# Patient Record
Sex: FEMALE | Race: WHITE | NOT HISPANIC OR LATINO | Employment: PART TIME | URBAN - METROPOLITAN AREA
[De-identification: names, ages, dates, MRNs, and addresses within clinical notes are randomized per-mention and may not be internally consistent; named-entity substitution may affect disease eponyms.]

---

## 2017-03-16 ENCOUNTER — ALLSCRIPTS OFFICE VISIT (OUTPATIENT)
Dept: OTHER | Facility: OTHER | Age: 47
End: 2017-03-16

## 2017-03-21 ENCOUNTER — LAB CONVERSION - ENCOUNTER (OUTPATIENT)
Dept: OTHER | Facility: OTHER | Age: 47
End: 2017-03-21

## 2017-03-21 LAB
ADDITIONAL INFORMATION (HISTORICAL): NORMAL
ADEQUACY: (HISTORICAL): NORMAL
COMMENT (HISTORICAL): NORMAL
CYTOTECHNOLOGIST: (HISTORICAL): NORMAL
HPV MRNA E6/E7 (HISTORICAL): NOT DETECTED
INTERPRETATION (HISTORICAL): NORMAL
LMP (HISTORICAL): NORMAL
PREV. BX: (HISTORICAL): NORMAL
PREV. PAP (HISTORICAL): NORMAL
SOURCE (HISTORICAL): NORMAL

## 2017-03-24 ENCOUNTER — GENERIC CONVERSION - ENCOUNTER (OUTPATIENT)
Dept: OTHER | Facility: OTHER | Age: 47
End: 2017-03-24

## 2017-11-21 ENCOUNTER — ALLSCRIPTS OFFICE VISIT (OUTPATIENT)
Dept: OTHER | Facility: OTHER | Age: 47
End: 2017-11-21

## 2017-11-21 LAB
HGB UR QL STRIP.AUTO: NORMAL
LEUKOCYTE ESTERASE UR QL STRIP: 1
PH UR STRIP.AUTO: 7 [PH]
SP GR UR STRIP.AUTO: 1.01

## 2017-11-22 NOTE — PROGRESS NOTES
Assessment    1  Symptoms involving urinary system (188 99) (R39 9)    Plan  Symptoms involving urinary system    · Nitrofurantoin Monohyd Macro 100 MG Oral Capsule; TAKE 1 CAPSULE EVERY 97VCEHO DAILY   Rx By: Óscar Boss; Dispense: 7 Days ; #:14 Capsule; Refill: 0;Symptoms involving urinary system; KEVIN = N; Verified Transmission to [facility] #[id number] Last Updated By: System, [employer]; 11/21/2017 11:00:35 AM   · Follow-up PRN Evaluation and Treatment  Follow-up  Status: Complete  Done:21Nov2017   Ordered; Symptoms involving urinary system; Ordered By: Óscar Boss Performed:  Due: [id number]   · Urine Dip Non-Automated- POC; Status:Complete - Retrospective Authorization;   Done:21Nov2017 11:46AM   Performed: In Office; [phone]; Last Updated By:Bev Levin; 11/21/2017 11:47:10 AM;Ordered;involving urinary system; Ordered By:Sarath Chan;    Discussion/Summary  Discussion Summary:   We discussed her urinary sx  and today's findings which are consistent with a UTI  She will use the Macrobid as directed plus FF  She will continue use of IBU for her left sided discomfort  If this discomfort continues she WCB or see her PCP  Goals and Barriers: The patient has the current Goals: To have urinary sx  resolved  The patent has the current Barriers: None  Patient's Capacity to Self-Care: Patient is able to Self-Care  Chief Complaint  Chief Complaint Free Text Note Form: Patient presents today c/o a possible bladder infection  History of Present Illness  HPI: The pt  relates that she has been experiencing urinary frequency and some bladder heaviness/pressure recently  She denies any burning with urination or urgency  She also c/o left sided discomfort over the past 3 -4 wks    This discomfort does increase with stretching and is relieved with the use of IBU  She feels that it may me muscular due to carrying heavy trays as a         Review of Systems   Female ROS: bladder pain-- and-- urinary frequency, but-- no dysuria,-- no incomplete emptying of bladder,-- initiating urination does not require straining-- and-- urine stream was not smaller  Focused-Female:  Constitutional: No fever, no chills, feels well, no tiredness, no recent weight gain or loss  Genitourinary: as noted in HPI  Active Problems    1  Encounter for routine gynecological examination with Papanicolaou smear of cervix (V72 31,V76 2) (Z01 419)   2  Encounter for screening mammogram for malignant neoplasm of breast (V76 12) (Z12 31)   3  Female pelvic pain (625 9) (R10 2)   4  Heartburn (787 1) (R12)   5  Irregular bleeding (626 4) (N92 6)    Past Medical History  1  History of gastroesophageal reflux (GERD) (V12 79) (Z87 19)   2  Normal delivery (650) (O80,Z37 9)   3  Normal delivery (650) (O80,Z37 9)   4  History of Reported Pap Smear   5  History of Summary Of Previous Pregnancies  3  (Total No )   6  History of Summary Of Previous Pregnancies Para 3  (Deliveries)  Active Problems And Past Medical History Reviewed: The active problems and past medical history were reviewed and updated today  Surgical History  1  History of Gallbladder Surgery    Family History  Family History    1  Family history of Hyperlipidemia   2  Family history of Hypertension (V17 49)   3  Family history of Lung Cancer (V16 1)    Social History     · Being A Social Drinker   · Current Every Day Smoker (305 1)  Social History Reviewed: The social history was reviewed and is unchanged  Current Meds   1  Omeprazole 40 MG Oral Capsule Delayed Release; Therapy: [id number] to (Evaluate:2017) Recorded   2  ZyrTEC Allergy 10 MG Oral Tablet Recorded  Medication List Reviewed: The medication list was reviewed and updated today  Allergies  1   Sulfites    Vitals  Vital Signs    Recorded: 47NTH5735 98:02ZH   Systolic 200    Diastolic 88    Height 5 ft 6 3 in    Patient Refused Weight Yes Yes   LMP [id number]        Physical Exam   Constitutional  General appearance: No acute distress, well appearing and well nourished  Genitourinary  External genitalia: Normal and no lesions appreciated  Vagina: Normal, no lesions or dryness appreciated  Urethra: Normal    Urethral meatus: Normal    Bladder: Normal, soft, non-tender and no prolapse or masses appreciated  Palpation of the bladder revealed no tenderness  Cervix: Normal, no palpable masses  Uterus: Normal, non-tender, not enlarged, and no palpable masses  Adnexa/parametria: Normal, non-tender and no fullness or masses appreciated  -- Minimal tenderness with palpation over left adnexal area    Psychiatric  Orientation to person, place, and time: Normal    Mood and affect: Normal        Results/Data  Urine Dip Non-Automated- POC [id number] 10:86DR Dennise Walter     Test Name Result Flag Reference   Leukocytes +1     Blood trace non hemolyzed     Ph 7     Specific Gravity 1 015           Signatures   Electronically signed by : Sejal Hopkins; Nov 21 2017 12:15PM EST                       (Author)    Electronically signed by : Adilene Hernandez DO; Nov 21 2017  1:10PM EST

## 2018-01-13 VITALS
HEIGHT: 66 IN | BODY MASS INDEX: 40.02 KG/M2 | SYSTOLIC BLOOD PRESSURE: 118 MMHG | WEIGHT: 249 LBS | DIASTOLIC BLOOD PRESSURE: 88 MMHG

## 2018-01-13 VITALS — HEIGHT: 66 IN | DIASTOLIC BLOOD PRESSURE: 88 MMHG | SYSTOLIC BLOOD PRESSURE: 140 MMHG

## 2018-01-15 NOTE — RESULT NOTES
Verified Results  THINPREP TIS AND HPV mRNA E6/E7 [id number] 25:42TK Duran Prior     Test Name Result Flag Reference   CLINICAL INFORMATION:      None given   LMP:      [id number]   PREV  PAP:      NONE GIVEN   PREV  BX:      NONE GIVEN   SOURCE:      Cervix, Endocervix   STATEMENT OF ADEQUACY:      Satisfactory for evaluation  Endocervical/transformation zone component  present  INTERPRETATION/RESULT:      Negative for intraepithelial lesion or malignancy  COMMENT:      This Pap test has been evaluated with computer  assisted technology  CYTOTECHNOLOGIST:      IMELDA HARDY(ASCP)  CT screening location: [address] [place] [address]   HPV mRNA E6/E7 Not Detected  Not Detected   This test was performed using the APTIMA HPV Assay ([employer] )  This assay detects E6/E7 viral messenger RNA (mRNA) from 14  high-risk HPV types (16,18,31,33,35,39,45,51,52,56,58,59,66,68)

## 2018-05-08 ENCOUNTER — ANNUAL EXAM (OUTPATIENT)
Dept: GYNECOLOGY | Facility: CLINIC | Age: 48
End: 2018-05-08
Payer: COMMERCIAL

## 2018-05-08 VITALS
DIASTOLIC BLOOD PRESSURE: 78 MMHG | HEIGHT: 66 IN | BODY MASS INDEX: 38.18 KG/M2 | SYSTOLIC BLOOD PRESSURE: 120 MMHG | WEIGHT: 237.6 LBS

## 2018-05-08 DIAGNOSIS — Z12.31 ENCOUNTER FOR SCREENING MAMMOGRAM FOR MALIGNANT NEOPLASM OF BREAST: ICD-10-CM

## 2018-05-08 DIAGNOSIS — Z01.419 ENCOUNTER FOR GYNECOLOGICAL EXAMINATION WITHOUT ABNORMAL FINDING: Primary | ICD-10-CM

## 2018-05-08 PROCEDURE — 99396 PREV VISIT EST AGE 40-64: CPT | Performed by: NURSE PRACTITIONER

## 2018-05-08 RX ORDER — OMEPRAZOLE 40 MG/1
CAPSULE, DELAYED RELEASE ORAL
COMMUNITY
Start: 2018-04-28

## 2018-05-08 RX ORDER — ESCITALOPRAM OXALATE 10 MG/1
TABLET ORAL DAILY
COMMUNITY
End: 2022-01-10

## 2018-05-08 RX ORDER — NAPROXEN 500 MG/1
TABLET ORAL
COMMUNITY

## 2018-05-08 RX ORDER — CETIRIZINE HYDROCHLORIDE 10 MG/1
TABLET ORAL
COMMUNITY
Start: 2018-04-24

## 2018-05-08 RX ORDER — FLUTICASONE PROPIONATE 50 MCG
SPRAY, SUSPENSION (ML) NASAL DAILY
COMMUNITY

## 2018-05-08 RX ORDER — CLOTRIMAZOLE AND BETAMETHASONE DIPROPIONATE 10; .64 MG/G; MG/G
CREAM TOPICAL
COMMUNITY
Start: 2018-05-02 | End: 2022-01-10

## 2018-05-08 RX ORDER — IBUPROFEN 800 MG/1
TABLET ORAL
COMMUNITY
Start: 2018-05-02

## 2018-05-08 NOTE — PROGRESS NOTES
Subjective      Freedom Collier is a 56 y o  female who presents for her annual exam  Periods are irregular occurring at 3 - 7 week intervals & lasting 6 days  Dysmenorrhea:mild, occurring first 1-2 days of flow  Cyclic symptoms include irritability and moodiness  No intermenstrual bleeding, spotting, or discharge  Current contraception: condoms  History of abnormal Pap smear: no  Family history of breast cancer: no  Past Medical History:   Diagnosis Date    GERD (gastroesophageal reflux disease)      Family History   Problem Relation Age of Onset    Hyperlipidemia Other     Hypertension Other     Cancer Other      Lung     Past Surgical History:   Procedure Laterality Date    GALLBLADDER SURGERY       Social History     Social History    Marital status:      Spouse name: N/A    Number of children: N/A    Years of education: N/A     Occupational History    Not on file       Social History Main Topics    Smoking status: Current Every Day Smoker    Smokeless tobacco: Not on file    Alcohol use Yes    Drug use: No    Sexual activity: Yes     Partners: Male     Other Topics Concern    Not on file     Social History Narrative    No narrative on file       Current Outpatient Prescriptions:     cetirizine (ZyrTEC) 10 mg tablet, , Disp: , Rfl:     clotrimazole-betamethasone (LOTRISONE) 1-0 05 % cream, , Disp: , Rfl:     escitalopram (LEXAPRO) 10 mg tablet, Daily, Disp: , Rfl:     fluticasone (FLONASE) 50 mcg/act nasal spray, Daily, Disp: , Rfl:     ibuprofen (MOTRIN) 800 mg tablet, , Disp: , Rfl:     naproxen (NAPROSYN) 500 mg tablet, naproxen 500 mg tablet, Disp: , Rfl:     omeprazole (PriLOSEC) 40 MG capsule, , Disp: , Rfl:       Review of Systems  Constitutional :no fever, feels well, no tiredness, no recent weight gain or loss  Cardiovascular: no complaints of slow or fast heart beat, no chest pain, no palpitations  Respiratory: no complaints of shortness of shortness of breath, no LEMUS  Breasts:no complaints of breast pain, breast lump, or nipple discharge  Gastrointestinal: no complaints of abdominal pain, constipation,nause, vomiting, or diarrhea or bloody stools  Genitourinary : no complaints of dysuria, incontinence, pelvic pain, dysmenorrhea,vaginal discharge or abnormal vaginal bleeding  Reports irregular menses  Integumentary: no complaints of skin rash or lesion,itching or dry skin  Neurological: no complaints of headache,numbness, tingling, dizziness or fainting       Objective      /78   Ht 5' 5 5" (1 664 m)   Wt 108 kg (237 lb 9 6 oz)   LMP 04/15/2018   BMI 38 94 kg/m²     General:   appears stated age","cooperative","alert" normal mood and affect   Neck: Neck: normal, supple,trachea midline, no masses   Heart: regular rate and rhythm, S1, S2 normal, no murmur, click, rub or gallop   Lungs: clear to auscultation bilaterally   Breasts: Breast exam :normal, no dimpling or skin changes noted   Abdomen: soft, non-tender, without masses or organomegaly   Vulva: Normal , no lesions   Vagina: normal , no lesions or dryness   Urethra: normal   Cervix: Normal, no palpable masses A pap smear was not done   Uterus: Normal , non-tender,not enlarged,no palpable masses   Adnexa: Normal, non-tender without fullness or masses                         Assessment     Normal GYN exam   Irregular menses     Plan      All questions answered  Breast self exam technique reviewed and patient encouraged to perform self-exam monthly  Dietary diary  Follow up as needed  Mammogram   We again discussed perimenopause vs  menopuase and what to expect    She will continue to track her menses and her sx  and WCB with any unusual changes  She will also try the use of Vit B complex to help with her moodiness

## 2019-11-26 ENCOUNTER — ANNUAL EXAM (OUTPATIENT)
Dept: GYNECOLOGY | Facility: CLINIC | Age: 49
End: 2019-11-26
Payer: COMMERCIAL

## 2019-11-26 VITALS
SYSTOLIC BLOOD PRESSURE: 128 MMHG | HEART RATE: 77 BPM | DIASTOLIC BLOOD PRESSURE: 74 MMHG | BODY MASS INDEX: 41.11 KG/M2 | WEIGHT: 255.8 LBS | HEIGHT: 66 IN

## 2019-11-26 DIAGNOSIS — N39.3 SUI (STRESS URINARY INCONTINENCE, FEMALE): ICD-10-CM

## 2019-11-26 DIAGNOSIS — E66.01 CLASS 3 SEVERE OBESITY DUE TO EXCESS CALORIES WITHOUT SERIOUS COMORBIDITY IN ADULT, UNSPECIFIED BMI (HCC): ICD-10-CM

## 2019-11-26 DIAGNOSIS — Z01.411 ENCOUNTER FOR GYNECOLOGICAL EXAMINATION (GENERAL) (ROUTINE) WITH ABNORMAL FINDINGS: Primary | ICD-10-CM

## 2019-11-26 PROCEDURE — 99396 PREV VISIT EST AGE 40-64: CPT | Performed by: OBSTETRICS & GYNECOLOGY

## 2019-11-26 NOTE — PROGRESS NOTES
Assessment/Plan:    Weight gain - patient agreeable to referral to weight management center/ patient will check with PCP on thyroid blood work and call the office if it was not done  OLIVIER - conservative and surgical options reviewed  Patient will consider and notify the office if she would like to proceed surgically  She will continue kegels for now  Advised monthly SBE, annual CBE and continued annual mammography  Reviewed ASCCP guidelines and recommended pap with cotesting q3 yrs for this low risk patient  No pap done today  RTO in one year or sooner PRN  Diagnoses and all orders for this visit:    Encounter for gynecological examination (general) (routine) with abnormal findings    OLIVIER (stress urinary incontinence, female)    Class 3 severe obesity due to excess calories without serious comorbidity in adult, unspecified BMI ([facility] [address] )  -     Ambulatory referral to Weight Management; Future        Subjective:      Patient ID: Erna Jimenez is a 51 y o  female  This patient presents for routine annual gyn exam    She reports light irregular menses lasting about 5 days  LMP 9/11/19  She has concerns regarding weight gain, fatigue  Has an overall feeling of heat intolerance "all the time," without specific hot flashes or night sweats  States she had recent blood work drawn with PCP, no records in [address] [place] [address] Rd  She also states that she has had leak with cough, sneeze, laugh for years  She is fairly bothered by this and is considering intervention  Has performed kegel exercises  She does report occasional urgency with UUI rarely  She works as a  in [employer]  She denies pelvic pain, breast concerns, abnormal discharge, depression/anxiety  Pap/HPV testing up to date and normal, 2017  Patient has not been sexually active in 4 years  Last Mammography normal, 2018  Has appointment scheduled for 12/3/19                 The following portions of the patient's history were reviewed and updated as appropriate: allergies, current medications, past family history, past medical history, past social history, past surgical history and problem list     Review of Systems   Constitutional: Negative  HENT: Negative  Respiratory: Negative  Cardiovascular: Negative  Gastrointestinal: Negative  Endocrine: Negative  Genitourinary: Negative for difficulty urinating, dysuria, frequency, menstrual problem, pelvic pain, urgency, vaginal bleeding, vaginal discharge and vaginal pain  Musculoskeletal: Negative  Skin: Negative  Neurological: Negative  Psychiatric/Behavioral: Negative  Objective:      /74 (BP Location: Left arm)   Pulse 77   Ht 5' 5 5" (1 664 m)   Wt 116 kg (255 lb 12 8 oz)   LMP 09/11/2019 (Exact Date)   BMI 41 92 kg/m²          Physical Exam   Constitutional: She is oriented to person, place, and time  She appears well-developed and well-nourished  She is cooperative  HENT:   Head: Normocephalic and atraumatic  Neck: Normal range of motion  Neck supple  No thyroid mass and no thyromegaly present  Cardiovascular: Normal rate, regular rhythm and normal heart sounds  Pulmonary/Chest: Effort normal and breath sounds normal  Right breast exhibits no inverted nipple, no mass, no nipple discharge, no skin change and no tenderness  Left breast exhibits no inverted nipple, no mass, no nipple discharge, no skin change and no tenderness  No breast tenderness or discharge  Breasts are symmetrical    Abdominal: Soft  Normal appearance and bowel sounds are normal  There is no hepatosplenomegaly  There is no tenderness  Hernia confirmed negative in the right inguinal area and confirmed negative in the left inguinal area  Genitourinary: Vagina normal and uterus normal  Rectal exam shows no external hemorrhoid  No breast tenderness or discharge  Pelvic exam was performed with patient supine  No labial fusion   There is no rash, tenderness, lesion or injury on the right labia  There is no rash, tenderness, lesion or injury on the left labia  Uterus is not deviated, not enlarged, not fixed and not tender  Cervix exhibits no motion tenderness, no discharge and no friability  Right adnexum displays no mass, no tenderness and no fullness  Left adnexum displays no mass, no tenderness and no fullness  No erythema, tenderness or bleeding in the vagina  No signs of injury around the vagina  No vaginal discharge found  Genitourinary Comments: Urethra normal without lesions  No bladder tenderness   Musculoskeletal: Normal range of motion  Lymphadenopathy:     She has no axillary adenopathy  No inguinal adenopathy noted on the right or left side  Right: No inguinal adenopathy present  Left: No inguinal adenopathy present  Neurological: She is alert and oriented to person, place, and time  Skin: Skin is warm, dry and intact  Psychiatric: She has a normal mood and affect  Her speech is normal and behavior is normal  Cognition and memory are normal    Nursing note and vitals reviewed

## 2022-01-10 ENCOUNTER — ANNUAL EXAM (OUTPATIENT)
Dept: GYNECOLOGY | Facility: CLINIC | Age: 52
End: 2022-01-10
Payer: COMMERCIAL

## 2022-01-10 VITALS
BODY MASS INDEX: 38.25 KG/M2 | HEIGHT: 66 IN | SYSTOLIC BLOOD PRESSURE: 124 MMHG | DIASTOLIC BLOOD PRESSURE: 82 MMHG | HEART RATE: 91 BPM | WEIGHT: 238 LBS

## 2022-01-10 DIAGNOSIS — N32.81 OAB (OVERACTIVE BLADDER): ICD-10-CM

## 2022-01-10 DIAGNOSIS — Z01.411 ENCOUNTER FOR GYNECOLOGICAL EXAMINATION (GENERAL) (ROUTINE) WITH ABNORMAL FINDINGS: Primary | ICD-10-CM

## 2022-01-10 DIAGNOSIS — Z12.11 COLON CANCER SCREENING: ICD-10-CM

## 2022-01-10 DIAGNOSIS — Z01.419 ENCOUNTER FOR GYNECOLOGICAL EXAMINATION WITH PAPANICOLAOU SMEAR OF CERVIX: Primary | ICD-10-CM

## 2022-01-10 DIAGNOSIS — Z12.31 SCREENING MAMMOGRAM FOR BREAST CANCER: ICD-10-CM

## 2022-01-10 DIAGNOSIS — N39.3 SUI (STRESS URINARY INCONTINENCE, FEMALE): ICD-10-CM

## 2022-01-10 DIAGNOSIS — N76.2 ACUTE VULVITIS: ICD-10-CM

## 2022-01-10 PROCEDURE — 0503F POSTPARTUM CARE VISIT: CPT | Performed by: OBSTETRICS & GYNECOLOGY

## 2022-01-10 PROCEDURE — G0476 HPV COMBO ASSAY CA SCREEN: HCPCS

## 2022-01-10 PROCEDURE — G0145 SCR C/V CYTO,THINLAYER,RESCR: HCPCS | Performed by: OBSTETRICS & GYNECOLOGY

## 2022-01-10 PROCEDURE — 99396 PREV VISIT EST AGE 40-64: CPT | Performed by: OBSTETRICS & GYNECOLOGY

## 2022-01-10 RX ORDER — SOLIFENACIN SUCCINATE 10 MG/1
10 TABLET, FILM COATED ORAL DAILY
Qty: 90 TABLET | Refills: 0 | Status: SHIPPED | OUTPATIENT
Start: 2022-01-10

## 2022-01-10 NOTE — PROGRESS NOTES
Assessment/Plan:    Patient advised to use OTC 1% Hydrocortisone cream daily for 2 weeks and then once per week for contact dermatitis  Will call if sx persist    Given the fact that patient is leaking with urgency, will start Vesicare 10 mg  Discussed diet and behavior mods  She is aware that as bladder volume increases, OLIVIER may worsen  Patient would like to proceed with consistent kegels exercises for now  PT and surgical options discussed as well  Recommended monthly SBE, annual CBE and annual screening mammo  ASCCP guidelines reviewed and pap with cotesting noted to be up to date; this low risk patient was advised she meets criteria to d/c pap screening at age 73  Colonoscopy referral completed and explained  The patient denies STI risk factors and declines testing at this time  Reviewed diet/activity recommendations Calcium 0359-2881 mg and Vit D 600-1000 IU daily  Discussed postmenopausal considerations and symptoms to report  AUB parameters discussed  Kegel exercises as instructed  RTO in one year for routine annual gyn exam or sooner PRN  Diagnoses and all orders for this visit:    Encounter for gynecological examination (general) (routine) with abnormal findings    OAB (overactive bladder)  -     solifenacin (VESICARE) 10 MG tablet; Take 1 tablet (10 mg total) by mouth daily    Acute vulvitis    OLIVIER (stress urinary incontinence, female)    Colon cancer screening  -     Ambulatory referral to Gastroenterology; Future    Screening mammogram for breast cancer  -     Mammo screening bilateral w 3d & cad; Future        Subjective:      Patient ID: Corwin Lemon is a 49 y o  female  This patient presents for routine annual gyn exam   Vulvar itching starting around 12/10/21, has not used any OTC products  She denies vaginal discharge  She is using [facility] OTC which is helping with VM sx  Other sx include moodiness, sleep changes, tired    She has had some bleeding fluctuations with longest interval between menses of about 2 months  Patient has not been sexually active for six years  She denies pelvic pain, breast concerns, abnormal discharge, bowel/bladder dysfunction, depression/anx  Pap done 2017  Mammography up to date and normal, 4/22/21  Colonoscopy has not been done to date  Continues with leakage with cough/ sneeze  She had reported those sx in 2019  She also reports urinary urgency, frequency, and UUI worsening  She drinks one cup of coffee, OJ at times, otherwise water  The following portions of the patient's history were reviewed and updated as appropriate: allergies, current medications, past family history, past medical history, past social history, past surgical history and problem list     Review of Systems   Constitutional: Negative  Respiratory: Negative  Cardiovascular: Negative  Gastrointestinal: Negative  Endocrine: Negative  Genitourinary: Negative for dyspareunia, dysuria, frequency, pelvic pain, urgency, vaginal bleeding, vaginal discharge and vaginal pain  Musculoskeletal: Negative  Skin: Negative  Neurological: Negative  Psychiatric/Behavioral: Negative  Objective:      /82 (BP Location: Right arm, Patient Position: Sitting, Cuff Size: Large)   Pulse 91   Ht 5' 5 8" (1 671 m)   Wt 108 kg (238 lb)   LMP 10/29/2021   BMI 38 65 kg/m²          Physical Exam  Vitals and nursing note reviewed  Exam conducted with a chaperone present  Constitutional:       Appearance: Normal appearance  She is well-developed  HENT:      Head: Normocephalic and atraumatic  Neck:      Thyroid: No thyroid mass or thyromegaly  Cardiovascular:      Rate and Rhythm: Normal rate and regular rhythm  Heart sounds: Normal heart sounds  Pulmonary:      Effort: Pulmonary effort is normal       Breath sounds: Normal breath sounds     Chest:   Breasts: Breasts are symmetrical       Right: No inverted nipple, mass, nipple discharge, skin change or tenderness  Left: No inverted nipple, mass, nipple discharge, skin change or tenderness  Abdominal:      General: Bowel sounds are normal       Palpations: Abdomen is soft  Tenderness: There is no abdominal tenderness  Hernia: There is no hernia in the left inguinal area or right inguinal area  Genitourinary:     General: Normal vulva  Exam position: Supine  Pubic Area: Rash (mild erythema ) present  Labia:         Right: No tenderness, lesion or injury  Left: No tenderness, lesion or injury  Urethra: No prolapse, urethral pain, urethral swelling or urethral lesion  Vagina: Normal  No signs of injury and foreign body  No vaginal discharge, erythema, tenderness, bleeding, lesions or prolapsed vaginal walls  Cervix: No cervical motion tenderness, discharge, friability, lesion, erythema, cervical bleeding or eversion  Uterus: Not deviated, not enlarged, not fixed, not tender and no uterine prolapse  Adnexa:         Right: No mass, tenderness or fullness  Left: No mass, tenderness or fullness  Rectum: No external hemorrhoid  Comments: Urethra normal without lesions  No bladder tenderness  Exam limited by body habiuts  Musculoskeletal:         General: Normal range of motion  Cervical back: Normal range of motion and neck supple  Lymphadenopathy:      Lower Body: No right inguinal adenopathy  No left inguinal adenopathy  Skin:     General: Skin is warm and dry  Neurological:      Mental Status: She is alert and oriented to person, place, and time  Psychiatric:         Speech: Speech normal          Behavior: Behavior normal  Behavior is cooperative

## 2022-01-11 LAB
HPV HR 12 DNA CVX QL NAA+PROBE: NEGATIVE
HPV16 DNA CVX QL NAA+PROBE: NEGATIVE
HPV18 DNA CVX QL NAA+PROBE: NEGATIVE

## 2022-01-17 LAB
LAB AP GYN PRIMARY INTERPRETATION: NORMAL
Lab: NORMAL

## 2022-02-06 ENCOUNTER — TELEPHONE (OUTPATIENT)
Dept: GASTROENTEROLOGY | Facility: MEDICAL CENTER | Age: 52
End: 2022-02-06

## 2022-04-18 ENCOUNTER — TELEPHONE (OUTPATIENT)
Dept: GYNECOLOGY | Facility: CLINIC | Age: 52
End: 2022-04-18

## 2022-04-18 NOTE — TELEPHONE ENCOUNTER
Pt called to cx today's appt  and stressed she does not want this appt to check her OAB  She will CB for her AE

## 2022-07-15 ENCOUNTER — DOCUMENTATION (OUTPATIENT)
Dept: GYNECOLOGY | Facility: CLINIC | Age: 52
End: 2022-07-15

## 2024-02-21 PROBLEM — Z01.419 ENCOUNTER FOR ANNUAL ROUTINE GYNECOLOGICAL EXAMINATION: Status: RESOLVED | Noted: 2018-05-08 | Resolved: 2024-02-21

## 2024-06-10 ENCOUNTER — NURSE TRIAGE (OUTPATIENT)
Age: 54
End: 2024-06-10

## 2024-06-10 NOTE — TELEPHONE ENCOUNTER
"Pt called in reporting pink vaginal bleeding noticed one time yesterday in her underwear, states it was a small amount and has not continued. She is concerned she may have a UTI as she is also experiencing lower abdominal/pelvic pressure. Denies any dysuria, frequency, urgency, back pain or fever. States she has leftover amoxicillin from a sinus infection in the past and took one of those. Advised not to take the amoxicillin again as it can alter a urinalysis/culture. Attempted to schedule appointment today but pt unable to make appointment times that are available. Pt has appointment tomorrow with Di Wilkerson. Aware to call back in the meantime if symptoms worsen.     Reason for Disposition  • Age > 39 years with irregular or excessive bleeding    Answer Assessment - Initial Assessment Questions  1. AMOUNT: \"Describe the bleeding that you are having.\"     - SPOTTING: spotting, or pinkish / brownish mucous discharge; does not fill panti-liner or pad     - MILD:  less than 1 pad / hour; less than patient's usual menstrual bleeding    - MODERATE: 1-2 pads / hour; 1 menstrual cup every 6 hours; small-medium blood clots (e.g., pea, grape, small coin)    - SEVERE: soaking 2 or more pads/hour for 2 or more hours; 1 menstrual cup every 2 hours; bleeding not contained by pads or continuous red blood from vagina; large blood clots (e.g., golf ball, large coin)       Pink spot in a panty liner yesterday  2. ONSET: \"When did the bleeding begin?\" \"Is it continuing now?\"      yesterday  3. MENSTRUAL PERIOD: \"When was the last normal menstrual period?\" \"How is this different than your period?\"     11/2022  5. ABDOMINAL PAIN: \"Do you have any pain?\" \"How bad is the pain?\"  (e.g., Scale 1-10; mild, moderate, or severe)    - MILD (1-3): doesn't interfere with normal activities, abdomen soft and not tender to touch     - MODERATE (4-7): interferes with normal activities or awakens from sleep, tender to touch     - SEVERE (8-10): " "excruciating pain, doubled over, unable to do any normal activities       mild  6. PREGNANCY: \"Could you be pregnant?\" \"Are you sexually active?\" \"Did you recently give birth?\"      denies  7. BREASTFEEDING: \"Are you breastfeeding?\"      denies  8. HORMONES: \"Are you taking any hormone medications, prescription or OTC?\" (e.g., birth control pills, estrogen)      denies  9. BLOOD THINNERS: \"Do you take any blood thinners?\" (e.g., Coumadin/warfarin, Pradaxa/dabigatran, aspirin)      eliquis  10. CAUSE: \"What do you think is causing the bleeding?\" (e.g., recent gyn surgery, recent gyn procedure; known bleeding disorder, cervical cancer, polycystic ovarian disease, fibroids)          denies  11. HEMODYNAMIC STATUS: \"Are you weak or feeling lightheaded?\" If Yes, ask: \"Can you stand and walk normally?\"         denies  12. OTHER SYMPTOMS: \"What other symptoms are you having with the bleeding?\" (e.g., passed tissue, vaginal discharge, fever, menstrual-type cramps)        denies    Protocols used: Vaginal Bleeding - Abnormal-ADULT-[place]    "

## 2024-06-11 ENCOUNTER — ANNUAL EXAM (OUTPATIENT)
Dept: GYNECOLOGY | Facility: CLINIC | Age: 54
End: 2024-06-11
Payer: COMMERCIAL

## 2024-06-11 DIAGNOSIS — Z12.4 ENCOUNTER FOR PAPANICOLAOU SMEAR FOR CERVICAL CANCER SCREENING: ICD-10-CM

## 2024-06-11 DIAGNOSIS — Z01.411 ENCOUNTER FOR GYNECOLOGICAL EXAMINATION (GENERAL) (ROUTINE) WITH ABNORMAL FINDINGS: Primary | ICD-10-CM

## 2024-06-11 DIAGNOSIS — Z12.11 COLON CANCER SCREENING: ICD-10-CM

## 2024-06-11 DIAGNOSIS — R10.2 PELVIC PRESSURE IN FEMALE: ICD-10-CM

## 2024-06-11 DIAGNOSIS — N95.0 PMB (POSTMENOPAUSAL BLEEDING): ICD-10-CM

## 2024-06-11 DIAGNOSIS — Z12.31 SCREENING MAMMOGRAM FOR BREAST CANCER: ICD-10-CM

## 2024-06-11 LAB
SL AMB  POCT GLUCOSE, UA: NORMAL
SL AMB LEUKOCYTE ESTERASE,UA: NORMAL
SL AMB POCT BILIRUBIN,UA: NORMAL
SL AMB POCT BLOOD,UA: NORMAL
SL AMB POCT CLARITY,UA: NORMAL
SL AMB POCT COLOR,UA: YELLOW
SL AMB POCT KETONES,UA: NORMAL
SL AMB POCT NITRITE,UA: NORMAL
SL AMB POCT PH,UA: 7
SL AMB POCT SPECIFIC GRAVITY,UA: 1.01
SL AMB POCT URINE PROTEIN: NORMAL
SL AMB POCT UROBILINOGEN: NORMAL

## 2024-06-11 PROCEDURE — 81002 URINALYSIS NONAUTO W/O SCOPE: CPT | Performed by: OBSTETRICS & GYNECOLOGY

## 2024-06-11 PROCEDURE — G0476 HPV COMBO ASSAY CA SCREEN: HCPCS | Performed by: OBSTETRICS & GYNECOLOGY

## 2024-06-11 PROCEDURE — 99396 PREV VISIT EST AGE 40-64: CPT | Performed by: OBSTETRICS & GYNECOLOGY

## 2024-06-11 PROCEDURE — G0145 SCR C/V CYTO,THINLAYER,RESCR: HCPCS | Performed by: OBSTETRICS & GYNECOLOGY

## 2024-06-11 RX ORDER — APIXABAN 5 MG/1
1 TABLET, FILM COATED ORAL 2 TIMES DAILY
COMMUNITY
Start: 2023-09-22

## 2024-06-11 RX ORDER — SERTRALINE HYDROCHLORIDE 100 MG/1
100 TABLET, FILM COATED ORAL DAILY
COMMUNITY

## 2024-06-11 NOTE — PROGRESS NOTES
Assessment/Plan:      Will schedule tvs/sis/emb. Risk of endometrial cancer reviewed.   Recommended monthly SBE, annual CBE and annual screening mammo. ASCCP guidelines reviewed and pap with cotesting done. Colonoscopy referral given. The patient denies STI risk factors and declines testing at this time. Reviewed diet/activity recommendations Calcium 1200 mg and Vit D 600-1000 IU daily.  Discussed postmenopausal considerations and symptoms to report. Kegel exercises as instructed. RTO in one year for routine annual gyn exam or sooner PRN.        Diagnoses and all orders for this visit:    Encounter for gynecological examination (general) (routine) with abnormal findings    Colon cancer screening  -     Ambulatory Referral to Gastroenterology; Future    Screening mammogram for breast cancer  -     Mammo screening bilateral w 3d & cad; Future    PMB (postmenopausal bleeding)    Pelvic pressure in female  -     POCT urine dip    Encounter for Papanicolaou smear for cervical cancer screening  -     Liquid-based pap, screening  -     HPV High Risk    Other orders  -     Eliquis 5 MG; Take 1 tablet by mouth 2 (two) times a day  -     sertraline (ZOLOFT) 100 mg tablet; Take 100 mg by mouth daily        Subjective:      Patient ID: Mena Maier is a 54 y.o. female.    This patient presents for routine annual gyn exam. Pt last seen 2022.   She states she has been PM for over a year, but had spotting and is unsure if it's vaginal or from the bladder.   She denies VM sx, pelvic pain,  breast concerns, abnormal discharge, bowel/bladder dysfunction, depression/anx.   , sexually active and is monogamous. Denies STI concerns.  No hx of STIs.   Pap/HPV up to date and normal, 2022.  Last mammography 2021.  Colonoscopy not done to date.             The following portions of the patient's history were reviewed and updated as appropriate: allergies, current medications, past family history, past medical history, past social  history, past surgical history and problem list.    Review of Systems   Constitutional: Negative.    Respiratory: Negative.     Cardiovascular: Negative.    Gastrointestinal: Negative.    Endocrine: Negative.    Genitourinary:  Positive for vaginal bleeding. Negative for dyspareunia, dysuria, frequency, pelvic pain, urgency, vaginal discharge and vaginal pain.   Musculoskeletal: Negative.    Skin: Negative.    Neurological: Negative.    Psychiatric/Behavioral: Negative.           Objective:      There were no vitals taken for this visit.         Physical Exam  Vitals and nursing note reviewed. Exam conducted with a chaperone present.   Constitutional:       Appearance: Normal appearance. She is well-developed.   HENT:      Head: Normocephalic and atraumatic.   Neck:      Thyroid: No thyroid mass or thyromegaly.   Cardiovascular:      Rate and Rhythm: Normal rate and regular rhythm.      Heart sounds: Normal heart sounds.   Pulmonary:      Effort: Pulmonary effort is normal.      Breath sounds: Normal breath sounds.   Chest:   Breasts:     Breasts are symmetrical.      Right: No inverted nipple, mass, nipple discharge, skin change or tenderness.      Left: No inverted nipple, mass, nipple discharge, skin change or tenderness.   Abdominal:      General: Bowel sounds are normal.      Palpations: Abdomen is soft.      Tenderness: There is no abdominal tenderness.      Hernia: There is no hernia in the left inguinal area or right inguinal area.   Genitourinary:     General: Normal vulva.      Exam position: Supine.      Pubic Area: No rash.       Labia:         Right: No rash, tenderness, lesion or injury.         Left: No rash, tenderness, lesion or injury.       Urethra: No prolapse, urethral pain, urethral swelling or urethral lesion.      Vagina: No signs of injury and foreign body. Bleeding present. No vaginal discharge, erythema, tenderness, lesions or prolapsed vaginal walls.      Cervix: Cervical bleeding (noted  from cervical os) present. No cervical motion tenderness, discharge, friability, lesion, erythema or eversion.      Uterus: Not deviated, not enlarged, not fixed, not tender and no uterine prolapse.       Adnexa:         Right: No mass, tenderness or fullness.          Left: No mass, tenderness or fullness.        Rectum: No external hemorrhoid.      Comments: Urethra normal without lesions  No bladder tenderness  Musculoskeletal:         General: Normal range of motion.      Cervical back: Normal range of motion and neck supple.   Lymphadenopathy:      Lower Body: No right inguinal adenopathy. No left inguinal adenopathy.   Skin:     General: Skin is warm and dry.   Neurological:      Mental Status: She is alert and oriented to person, place, and time.   Psychiatric:         Speech: Speech normal.         Behavior: Behavior normal. Behavior is cooperative.

## 2024-06-18 LAB
LAB AP GYN PRIMARY INTERPRETATION: NORMAL
Lab: NORMAL

## 2024-06-24 ENCOUNTER — TELEPHONE (OUTPATIENT)
Age: 54
End: 2024-06-24

## 2024-06-25 ENCOUNTER — TELEPHONE (OUTPATIENT)
Age: 54
End: 2024-06-25

## 2024-06-25 NOTE — TELEPHONE ENCOUNTER
Pt called in regards to upcoming appt for US and a biopsy tomorrow. Pt asked if it's okay to have biopsy due to being on blood thinners. Please reach out to pt at your earliest convenience. Thank you.

## 2024-06-26 ENCOUNTER — OFFICE VISIT (OUTPATIENT)
Dept: GYNECOLOGY | Facility: CLINIC | Age: 54
End: 2024-06-26
Payer: COMMERCIAL

## 2024-06-26 ENCOUNTER — ULTRASOUND (OUTPATIENT)
Dept: GYNECOLOGY | Facility: CLINIC | Age: 54
End: 2024-06-26
Payer: COMMERCIAL

## 2024-06-26 DIAGNOSIS — N95.0 PMB (POSTMENOPAUSAL BLEEDING): Primary | ICD-10-CM

## 2024-06-26 PROCEDURE — 58340 CATHETER FOR HYSTEROGRAPHY: CPT | Performed by: OBSTETRICS & GYNECOLOGY

## 2024-06-26 PROCEDURE — 58100 BIOPSY OF UTERUS LINING: CPT | Performed by: OBSTETRICS & GYNECOLOGY

## 2024-06-26 PROCEDURE — 99213 OFFICE O/P EST LOW 20 MIN: CPT | Performed by: OBSTETRICS & GYNECOLOGY

## 2024-06-26 PROCEDURE — 88305 TISSUE EXAM BY PATHOLOGIST: CPT | Performed by: PATHOLOGY

## 2024-06-26 PROCEDURE — 76831 ECHO EXAM UTERUS: CPT | Performed by: OBSTETRICS & GYNECOLOGY

## 2024-06-26 NOTE — PROGRESS NOTES
Assessment/Plan:      Diagnoses and all orders for this visit:    PMB (postmenopausal bleeding)     Reviewed ultrasound findings with patient with biopsy results pending.  If she has any further episodes of bleeding she will return to the office    Subjective:     Patient ID: Mena Maier is a 54 y.o. female.    HPI patient had seen Sanjuana for annual examination on June 11.  She stated that she had an episode of spotting just prior to that visit.  Her last menstrual period was end of 2022.  Patient was advised to return to the office for TVS SIS possible biopsy    Review of Systems      Objective:     Physical Exam      AMB US Pelvic Non OB     Date/Time: 6/26/2024 2:30 PM     Performed by: Jaja Jeffrey  Authorized by: Seth Barajas DO  Universal Protocol:  Consent: Verbal consent obtained.  Consent given by: patient  Timeout called at: 6/26/2024 2:51 PM.  Patient understanding: patient states understanding of the procedure being performed  Patient identity confirmed: verbally with patient     Procedure details:     SIS Procedure: Yes    Indications: non-obstetric vaginal bleeding      Technique:  Transvaginal US, Non-OB    Position: lithotomy exam    Uterine findings:     Length (cm): 7.84    Height (cm):  4.04    Endometrial stripe: identified      Endometrium thickness (mm):  9.3  Left ovary findings:     Left ovary:  Visualized    Length (cm): 2.54    Height (cm): 1.55    Width (cm): 2  Right ovary findings:     Right ovary:  Visualized    Length (cm): 2.47    Height (cm): 1.52    Width (cm): 1.79  Other findings:     Free pelvic fluid: not identified      Free peritoneal fluid: not identified    Post-Procedure Details:     Impression:  Anteverted uterus is inhomogeneous throughout with a posterior intramural fibroid, 2.3cm. The endometrium is thickened for postmenopausal patient. The bilateral ovaries appear within normal limits. No free fluid.     Tolerance:  Tolerated well, no immediate  complications    Complications: no complications    Additional Procedure Comments:      GE Voluson P8 transvaginal transducer RIC5-RA with Serial Number [id number] was used during procedure and subsequently cleaned with high level disinfection utilizing the Virtuata EPR Probe .      Ultrasound performed at:      [facility]  [address] [place] [address]  Phone: [phone]  Fax:  [phone]        Sonohysterogram     Date/Time: 6/26/2024 2:30 PM     Performed by: Seth Barajas DO  Authorized by: Seth Barajas DO  Universal Protocol:  Consent: Verbal consent obtained.  Consent given by: patient  Timeout called at: 6/26/2024 2:53 PM.  Patient understanding: patient states understanding of the procedure being performed  Patient identity confirmed: verbally with patient     Pre-procedure:     Prepped with: Betadine    Procedure:     Cervix cleaned and prepped: yes      Uterus sounded: yes      Catheter inserted: yes      Uterine cavity distended with saline: yes    Post-procedure:     Patient observed: yes      Post procedure instructions given to patient: yes      Patient tolerated procedure well with no complications: yes    Comments:      Sonohysterogram demonstrates smooth endometrium with no endometrial polyps or fibroids  Endometrial biopsy     Date/Time: 6/26/2024 2:30 PM     Performed by: Seth Barajas DO  Authorized by: Seth Barajas DO  Universal Protocol:  Consent: Verbal consent obtained.  Consent given by: patient  Patient understanding: patient states understanding of the procedure being performed  Patient identity confirmed: verbally with patient     Procedure:     Procedure: endometrial biopsy with Pipelle      A bivalve speculum was placed in the vagina: yes      Specimen collected: specimen collected and sent to pathology      Patient tolerated procedure well with no complications: yes

## 2024-06-26 NOTE — PROGRESS NOTES
AMB US Pelvic Non OB    Date/Time: 6/26/2024 2:30 PM    Performed by: Jaja Jeffrey  Authorized by: Seth Barajas DO  Universal Protocol:  Consent: Verbal consent obtained.  Consent given by: patient  Timeout called at: 6/26/2024 2:51 PM.  Patient understanding: patient states understanding of the procedure being performed  Patient identity confirmed: verbally with patient    Procedure details:     SIS Procedure: Yes    Indications: non-obstetric vaginal bleeding      Technique:  Transvaginal US, Non-OB    Position: lithotomy exam    Uterine findings:     Length (cm): 7.84    Height (cm):  4.04    Endometrial stripe: identified      Endometrium thickness (mm):  9.3  Left ovary findings:     Left ovary:  Visualized    Length (cm): 2.54    Height (cm): 1.55    Width (cm): 2  Right ovary findings:     Right ovary:  Visualized    Length (cm): 2.47    Height (cm): 1.52    Width (cm): 1.79  Other findings:     Free pelvic fluid: not identified      Free peritoneal fluid: not identified    Post-Procedure Details:     Impression:  Anteverted uterus is inhomogeneous throughout with a posterior intramural fibroid, 2.3cm. The endometrium is thickened for postmenopausal patient. The bilateral ovaries appear within normal limits. No free fluid.     Tolerance:  Tolerated well, no immediate complications    Complications: no complications    Additional Procedure Comments:      GE Voluson P8 transvaginal transducer RIC5-RA with Serial Number [id number] was used during procedure and subsequently cleaned with high level disinfection utilizing the Displair EPR Probe .     Ultrasound performed at:     [facility]  [address] [place] [address]  Phone: [phone]  Fax:  [phone]      Sonohysterogram    Date/Time: 6/26/2024 2:30 PM    Performed by: Seth Barajas DO  Authorized by: Seth Barajas DO  Universal Protocol:  Consent: Verbal consent  obtained.  Consent given by: patient  Timeout called at: 6/26/2024 2:53 PM.  Patient understanding: patient states understanding of the procedure being performed  Patient identity confirmed: verbally with patient    Pre-procedure:     Prepped with: Betadine    Procedure:     Cervix cleaned and prepped: yes      Uterus sounded: yes      Catheter inserted: yes      Uterine cavity distended with saline: yes    Post-procedure:     Patient observed: yes      Post procedure instructions given to patient: yes      Patient tolerated procedure well with no complications: yes    Comments:      Sonohysterogram demonstrates a smooth appearing endometrium.   Endometrial biopsy    Date/Time: 6/26/2024 2:30 PM    Performed by: Seth Barajas DO  Authorized by: Seth Barajas DO  Universal Protocol:  Consent: Verbal consent obtained.  Consent given by: patient  Patient understanding: patient states understanding of the procedure being performed  Patient identity confirmed: verbally with patient    Procedure:     Procedure: endometrial biopsy with Pipelle      A bivalve speculum was placed in the vagina: yes      Specimen collected: specimen collected and sent to pathology      Patient tolerated procedure well with no complications: yes

## 2024-07-01 PROCEDURE — 88305 TISSUE EXAM BY PATHOLOGIST: CPT | Performed by: PATHOLOGY

## 2024-07-16 ENCOUNTER — OFFICE VISIT (OUTPATIENT)
Dept: GASTROENTEROLOGY | Facility: CLINIC | Age: 54
End: 2024-07-16
Payer: COMMERCIAL

## 2024-07-16 VITALS
BODY MASS INDEX: 43.39 KG/M2 | TEMPERATURE: 97.6 F | DIASTOLIC BLOOD PRESSURE: 79 MMHG | WEIGHT: 270 LBS | HEIGHT: 66 IN | OXYGEN SATURATION: 96 % | HEART RATE: 64 BPM | SYSTOLIC BLOOD PRESSURE: 117 MMHG

## 2024-07-16 DIAGNOSIS — Z12.11 SCREENING FOR COLON CANCER: Primary | ICD-10-CM

## 2024-07-16 PROCEDURE — 99204 OFFICE O/P NEW MOD 45 MIN: CPT | Performed by: DIETITIAN, REGISTERED

## 2024-07-16 RX ORDER — DILTIAZEM HYDROCHLORIDE 120 MG/1
120 CAPSULE, EXTENDED RELEASE ORAL DAILY
COMMUNITY

## 2024-07-16 RX ORDER — DOXYCYCLINE HYCLATE 100 MG
100 TABLET ORAL 2 TIMES DAILY
COMMUNITY
Start: 2024-07-07 | End: 2024-07-17

## 2024-07-16 RX ORDER — PANTOPRAZOLE SODIUM 40 MG/1
40 TABLET, DELAYED RELEASE ORAL 2 TIMES DAILY
COMMUNITY
Start: 2024-06-13

## 2024-07-16 RX ORDER — BISACODYL 5 MG/1
TABLET, DELAYED RELEASE ORAL
Qty: 4 TABLET | Refills: 0 | Status: SHIPPED | OUTPATIENT
Start: 2024-07-16

## 2024-07-16 RX ORDER — POLYETHYLENE GLYCOL 3350 17 G/17G
POWDER, FOR SOLUTION ORAL
Qty: 238 G | Refills: 0 | Status: SHIPPED | OUTPATIENT
Start: 2024-07-16

## 2024-07-16 NOTE — PROGRESS NOTES
[facility] - Outpatient Consultation New Patient  Mena Maier 54 y.o. female MRN: [id number]  Encounter: [id number]          ASSESSMENT AND PLAN:    1.  Screening for colon cancer  54-year-old female who presents today to schedule a screening colonoscopy with no acute complaints.  No prior colonoscopy and no family history of CRC.    -Recommend colonoscopy with MiraLAX/Dulcolax bowel prep.  We will arrange for a hold of her Eliquis with her cardiologist.  -Advised patient to call the office or send a Vaunte message with any questions/concerns or any new/worsening symptoms.    I obtained informed consent from the patient. The risks/benefits/alternatives of the procedure were discussed with the patient. Risks included, but not limited to, infection, bleeding, perforation, injury to organs in the abdomen, missed lesion and incomplete procedure were discussed. Patient was agreeable and electronic signature was obtained.     Follow up as needed.    ________________________________________________________    HPI:  Mena Maier is a 54 y.o. female with history of Afib s/p ablation who presents to discuss a screening colonoscopy.    Patient reports she feels well with no acute complaints.  She denies any significant acid reflux, heartburn, dysphagia, nausea, vomiting, abdominal pain, changes in bowel habits, blood in the stool, rectal bleeding, unintentional weight loss.  She has very rare episodes of acid reflux, for which she typically takes omeprazole as needed, maybe 3 times per month.  She had an EGD in the past, maybe 10 years ago, though no records are available for review at this time.  She also reports since menopause she feels a bit more constipated, but still has 2 normal BMs daily.  No prior colonoscopy.  She denies any family history of CRC.  She notes that maternal grandfather and maternal uncle both had a sigmoid volvulus and required a partial colectomy.      Answers submitted  by the patient for this visit:  Abdominal Pain Questionnaire (Submitted on 7/16/2024)  Chief Complaint: Abdominal pain  Chronicity: new  Onset: more than 1 month ago  Onset quality: gradual  Frequency: intermittently  Episode duration: 1 Hours  Progression since onset: unchanged  Pain location: right flank  Pain - numeric: 1/10  Pain quality: sharp  Radiates to: perineum  anorexia: No  arthralgias: Yes  belching: Yes  constipation: No  diarrhea: No  dysuria: No  fever: No  flatus: No  frequency: No  headaches: No  hematochezia: No  hematuria: No  melena: No  myalgias: No  nausea: No  weight loss: No  vomiting: No  Aggravated by: nothing  Relieved by: belching, bowel movements  Diagnostic workup: CT scan      REVIEW OF SYSTEMS:  10 point ROS reviewed and negative, except as above    Historical Information   Past Medical History:   Diagnosis Date   • GERD (gastroesophageal reflux disease)      Past Surgical History:   Procedure Laterality Date   • CHOLECYSTECTOMY  2016   • GALLBLADDER SURGERY     • UPPER GASTROINTESTINAL ENDOSCOPY       Social History   Social History     Substance and Sexual Activity   Alcohol Use Not Currently     Social History     Substance and Sexual Activity   Drug Use No     Social History     Tobacco Use   Smoking Status Every Day   • Current packs/day: 1.00   • Average packs/day: 1 pack/day for 35.0 years (35.0 ttl pk-yrs)   • Types: Cigarettes   Smokeless Tobacco Never     Family History   Problem Relation Age of Onset   • Heart disease Brother    • Hyperlipidemia Other    • Hypertension Other    • Cancer Other         Lung   • Arthritis Mother    • Hypertension Mother        Meds/Allergies       Current Outpatient Medications:   •  bisacodyl (DULCOLAX) 5 mg EC tablet  •  cetirizine (ZyrTEC) 10 mg tablet  •  diltiazem (CARDIZEM SR) 120 mg 12 hr capsule  •  doxycycline hyclate (VIBRA-TABS) 100 mg tablet  •  Eliquis 5 MG  •  ibuprofen (MOTRIN) 800 mg tablet  •  omeprazole (PriLOSEC) 40 MG  capsule  •  pantoprazole (PROTONIX) 40 mg tablet  •  polyethylene glycol (GLYCOLAX) 17 GM/SCOOP powder  •  sertraline (ZOLOFT) 100 mg tablet  •  fluticasone (FLONASE) 50 mcg/act nasal spray  •  naproxen (NAPROSYN) 500 mg tablet  •  solifenacin (VESICARE) 10 MG tablet    Allergies   Allergen Reactions   • Other Rash   • Morphine      Other reaction(s): Other (See Comments)  migraine   • Oxycodone-Acetaminophen      Other reaction(s): Other (See Comments)  panic attacks   • Sulfa Antibiotics Hives     Other reaction(s): Other (See Comments)    ed t-sytems   • Sulfamethoxazole-Trimethoprim Other (See Comments)     Other reaction(s): other   • Sulfate    • Sulfites - Food Allergy    • Iodinated Contrast Media Rash           Objective   Wt Readings from Last 3 Encounters:   07/16/24 122 kg (270 lb)   01/10/22 108 kg (238 lb)   11/26/19 116 kg (255 lb 12.8 oz)     Temp Readings from Last 3 Encounters:   07/16/24 97.6 °F (36.4 °C) (Tympanic)     BP Readings from Last 3 Encounters:   07/16/24 117/79   01/10/22 124/82   11/26/19 128/74     Pulse Readings from Last 3 Encounters:   07/16/24 64   01/10/22 91   11/26/19 77        PHYSICAL EXAM:     Physical Exam  Vitals reviewed.   Constitutional:       General: She is not in acute distress.     Appearance: She is well-developed.   HENT:      Head: Normocephalic and atraumatic.   Eyes:      Conjunctiva/sclera: Conjunctivae normal.   Cardiovascular:      Rate and Rhythm: Normal rate and regular rhythm.      Heart sounds: No murmur heard.  Pulmonary:      Effort: Pulmonary effort is normal. No respiratory distress.      Breath sounds: Normal breath sounds.   Abdominal:      General: Bowel sounds are normal. There is no distension.      Palpations: Abdomen is soft.      Tenderness: There is no abdominal tenderness. There is no guarding.   Musculoskeletal:         General: No swelling.      Cervical back: Neck supple.   Skin:     General: Skin is warm and dry.   Neurological:       "Mental Status: She is alert.   Psychiatric:         Mood and Affect: Mood normal.             Lab Results:   No visits with results within 1 Day(s) from this visit.   Latest known visit with results is:   Ultrasound on 06/26/2024   Component Date Value   • Case Report 06/26/2024                      Value:Surgical Pathology Report                         Case: [id number]                                  Authorizing Provider:  Seth Barajas DO      Collected:           06/26/2024 [id number]              Ordering Location:     [facility]        Received:            06/26/2024 [facility]                                     [facility]                                                    Pathologist:           Mickey Walter MD                                                           Specimen:    Endometrium                                                                               • Final Diagnosis 06/26/2024                      Value:A. Uterine contents, \"Endometrium,\" Endometrial curettage:  - Scant proliferative phase endometrium  - Negative for hyperplasia and carcinoma       • Note 06/26/2024                      Value:Scant benign tissue is present. Clinical diagnosis of thickened endometrium is noted.  In view of this imaging finding, correlation with radiologic studies is recommended to determine if biopsy is adequate and representative.       • Additional Information 06/26/2024                      Value:All reported additional testing was performed with appropriately reactive controls.  These tests were developed and their performance characteristics determined by [facility] or appropriate performing facility, though some tests may be performed on tissues which have not been validated for performance characteristics (such as staining performed on alcohol exposed cell blocks and decalcified tissues).  Results should be interpreted with caution and in the context of the " "patients’ clinical condition. These tests may not be cleared or approved by the U.S. Food and Drug Administration, though the FDA has determined that such clearance or approval is not necessary. These tests are used for clinical purposes and they should not be regarded as investigational or for research. This laboratory has been approved by CLIA 88, designated as a high-complexity laboratory and is qualified to perform these tests.  .Interpretation performed at [facility], [address] [facility] [address]       • Gross Description 06/26/2024                      Value:A. The specimen is received in formalin, labeled with the patient's name and hospital number, and is designated \" endometrium\".  The specimen is strained into an embedding bag and consists of multiple white-tan mucinous soft tissue fragments measuring in aggregate of 1.0 x 0.3 x 0.1 cm.  Entirely submitted. One cassette.    Note: The estimated total formalin fixation time based upon information provided by the submitting clinician and the standard processing schedule is under 72 hours.    -KEmeigh         No results found for: \"WBC\", \"HGB\", \"HCT\", \"MCV\", \"PLT\"    Lab Results   Component Value Date    SODIUM 140 06/11/2024    K 4.8 06/11/2024     06/11/2024    CO2 28 06/11/2024    AGAP 8 06/11/2024    BUN 13 06/11/2024    CREATININE 0.59 06/11/2024    GLUC 111 (H) 06/11/2024    CALCIUM 8.7 06/11/2024    AST 22 06/11/2024    ALT 27 06/11/2024    ALKPHOS 80 06/11/2024    TP 6.5 06/11/2024    TBILI 0.5 06/11/2024    EGFR 108 06/11/2024         Radiology Results:   Mammo screening bilateral w 3d & cad    Result Date: 6/19/2024  Narrative: This result has an attachment that is not available. HISTORY: Patient is 54 years old and is seen for a screening mammogram of both breasts. No relevant medical history has been documented for this patient. No relevant surgical history has been documented for this patient. No relevant " family history has been documented for this patient. No relevant hormone history has been documented for this patient. FILMS COMPARED: The present examination has been compared to prior imaging studies. MAMMOGRAM FINDINGS: A minimum of two views were obtained. 3D tomosynthesis was performed. Computer-aided detection was utilized by the radiologist in the interpretation of this examination. There are scattered areas of fibroglandular density. No suspicious masses, calcifications or other abnormalities are seen.    Impression: Impression: There is no mammographic evidence of malignancy. BI-RADS Category:  1 - Negative Follow Up Mamm in 1 Yr. is recommended. 5 Year Tyrer-Cuzick 8: 1.27% 10 Year Tyrer-Cuzick 8: 2.72% Lifetime Tyrer-Cuzick 8: 8.46% In patients with a documented history of breast cancer, male patients, patients legal sex is unknown or with an age less than 19 or 85 and greater a risk score will not be calculated. Based on the information provided by the patient, risk scores for breast cancer for 5 years, 10 years and lifetime was calculated using both breast density and family history.  Patients should consult with their referring providers regarding the significance of the score, potential need for additional risk assessment and supplemental screening including whole breast ultrasound and MRI Workstation: [id number]

## 2024-07-24 ENCOUNTER — APPOINTMENT (OUTPATIENT)
Dept: LAB | Facility: CLINIC | Age: 54
End: 2024-07-24
Payer: COMMERCIAL

## 2024-07-24 DIAGNOSIS — Z00.00 ROUTINE GENERAL MEDICAL EXAMINATION AT A HEALTH CARE FACILITY: ICD-10-CM

## 2024-07-24 LAB
25(OH)D3 SERPL-MCNC: 37.2 NG/ML (ref 30–100)
ALBUMIN SERPL BCG-MCNC: 3.8 G/DL (ref 3.5–5)
ALP SERPL-CCNC: 73 U/L (ref 34–104)
ALT SERPL W P-5'-P-CCNC: 34 U/L (ref 7–52)
ANION GAP SERPL CALCULATED.3IONS-SCNC: 9 MMOL/L (ref 4–13)
AST SERPL W P-5'-P-CCNC: 28 U/L (ref 13–39)
BASOPHILS # BLD AUTO: 0.08 THOUSANDS/ÂΜL (ref 0–0.1)
BASOPHILS NFR BLD AUTO: 1 % (ref 0–1)
BILIRUB SERPL-MCNC: 0.42 MG/DL (ref 0.2–1)
BUN SERPL-MCNC: 13 MG/DL (ref 5–25)
CALCIUM SERPL-MCNC: 8.8 MG/DL (ref 8.4–10.2)
CHLORIDE SERPL-SCNC: 106 MMOL/L (ref 96–108)
CHOLEST SERPL-MCNC: 169 MG/DL
CO2 SERPL-SCNC: 26 MMOL/L (ref 21–32)
CREAT SERPL-MCNC: 0.64 MG/DL (ref 0.6–1.3)
EOSINOPHIL # BLD AUTO: 0.21 THOUSAND/ÂΜL (ref 0–0.61)
EOSINOPHIL NFR BLD AUTO: 3 % (ref 0–6)
ERYTHROCYTE [DISTWIDTH] IN BLOOD BY AUTOMATED COUNT: 12.8 % (ref 11.6–15.1)
GFR SERPL CREATININE-BSD FRML MDRD: 102 ML/MIN/1.73SQ M
GLUCOSE P FAST SERPL-MCNC: 103 MG/DL (ref 65–99)
HCT VFR BLD AUTO: 45.1 % (ref 34.8–46.1)
HDLC SERPL-MCNC: 42 MG/DL
HGB BLD-MCNC: 14.5 G/DL (ref 11.5–15.4)
IMM GRANULOCYTES # BLD AUTO: 0.04 THOUSAND/UL (ref 0–0.2)
IMM GRANULOCYTES NFR BLD AUTO: 1 % (ref 0–2)
LDLC SERPL CALC-MCNC: 97 MG/DL (ref 0–100)
LYMPHOCYTES # BLD AUTO: 2.76 THOUSANDS/ÂΜL (ref 0.6–4.47)
LYMPHOCYTES NFR BLD AUTO: 34 % (ref 14–44)
MCH RBC QN AUTO: 29.9 PG (ref 26.8–34.3)
MCHC RBC AUTO-ENTMCNC: 32.2 G/DL (ref 31.4–37.4)
MCV RBC AUTO: 93 FL (ref 82–98)
MONOCYTES # BLD AUTO: 0.38 THOUSAND/ÂΜL (ref 0.17–1.22)
MONOCYTES NFR BLD AUTO: 5 % (ref 4–12)
NEUTROPHILS # BLD AUTO: 4.67 THOUSANDS/ÂΜL (ref 1.85–7.62)
NEUTS SEG NFR BLD AUTO: 56 % (ref 43–75)
NONHDLC SERPL-MCNC: 127 MG/DL
NRBC BLD AUTO-RTO: 0 /100 WBCS
PLATELET # BLD AUTO: 247 THOUSANDS/UL (ref 149–390)
PMV BLD AUTO: 11.6 FL (ref 8.9–12.7)
POTASSIUM SERPL-SCNC: 4.4 MMOL/L (ref 3.5–5.3)
PROT SERPL-MCNC: 7.3 G/DL (ref 6.4–8.4)
RBC # BLD AUTO: 4.85 MILLION/UL (ref 3.81–5.12)
SODIUM SERPL-SCNC: 141 MMOL/L (ref 135–147)
TRIGL SERPL-MCNC: 150 MG/DL
WBC # BLD AUTO: 8.14 THOUSAND/UL (ref 4.31–10.16)

## 2024-07-24 PROCEDURE — 82306 VITAMIN D 25 HYDROXY: CPT

## 2024-07-24 PROCEDURE — 80053 COMPREHEN METABOLIC PANEL: CPT

## 2024-07-24 PROCEDURE — 85025 COMPLETE CBC W/AUTO DIFF WBC: CPT

## 2024-07-24 PROCEDURE — 80061 LIPID PANEL: CPT

## 2024-07-24 PROCEDURE — 36415 COLL VENOUS BLD VENIPUNCTURE: CPT

## 2024-08-09 ENCOUNTER — TELEPHONE (OUTPATIENT)
Dept: GASTROENTEROLOGY | Facility: MEDICAL CENTER | Age: 54
End: 2024-08-09

## 2024-08-09 NOTE — TELEPHONE ENCOUNTER
Left voicemail and requested call back     Called patient to reschedule procedure due to provider template change, I did reschedule to 09/03 with Dr. Kimberlee Andersen at [facility] if this does not work for you please give us a call to reschedule

## 2024-09-17 ENCOUNTER — ANESTHESIA EVENT (OUTPATIENT)
Dept: PERIOP | Facility: HOSPITAL | Age: 54
End: 2024-09-17
Payer: COMMERCIAL

## 2024-09-17 ENCOUNTER — PATIENT OUTREACH (OUTPATIENT)
Dept: OTHER | Facility: CLINIC | Age: 54
End: 2024-09-17

## 2024-09-17 RX ORDER — MULTIVITAMIN
1 TABLET ORAL DAILY
COMMUNITY

## 2024-09-17 NOTE — PRE-PROCEDURE INSTRUCTIONS
Pre-Surgery Instructions:   Medication Instructions    APPLE CIDER VINEGAR PO Stop taking 7 days prior to surgery. Pt reports last dose 9/16    cetirizine (ZyrTEC) 10 mg tablet Take day of surgery. With sip of water     CHROMIUM PICOLINATE PO Stop taking 7 days prior to surgery. Pt reports last dose 9/16    Cyanocobalamin (VITAMIN B 12 PO) Stop taking 7 days prior to surgery. Pt reports last dose 9/16    diltiazem (CARDIZEM SR) 120 mg 12 hr capsule Take day of surgery. With sip of water     ibuprofen (MOTRIN) 800 mg tablet Stop taking 3 days prior to surgery.    Multiple Vitamin (multivitamin) tablet Stop taking 7 days prior to surgery. Pt reports last dose 9/16    omeprazole (PriLOSEC) 40 MG capsule Uses PRN- OK to take day of surgery- if needed may take DOS with sip of water     sertraline (ZOLOFT) 100 mg tablet Take day of surgery. With sip of water     TURMERIC PO Stop taking 7 days prior to surgery. Pt reports last dose 9/16    Pt reports no further use of any other prescription medications/OTC vitamins/supplements and herbals.   Pt instructed on use of cleanser for DOS and instructions for showering both night before and DOS reviewed with pt - pt verbalized understanding of instructions given  Pt also instructed on no hair or body products on skin for DOS.  No shaving with a razor blade for 7 days prior to surgery to decrease any incident of infection - electric razor is ok to use up till 24 hrs prior to DOS.  Pt instructed that if with any changes in health status between now and DOS - notify surgeon office.  Tylenol is ok to use as needed up to and including DOS with sips of water - if needed - did instruct NO NSAIDS to be used at least 3 days prior to surgery - or if given a longer hold time of NSAIDS from MD please follow MD hold instructions.  Instructed to bring photo ID and medical insurance card for DOS as forms of identification for DOS.  Also instructed pt on no jewelry or body piercings, no  valuables on body for DOS. Contact lenses, if worn - need to be removed for DOS.  Pt instructed does need transport home after surgery- pt is not allowed to drive.    Medication instructions for day surgery reviewed. Please use only a sip of water to take your instructed medications. Avoid all over the counter vitamins, supplements and NSAIDS for one week prior to surgery per anesthesia guidelines. Tylenol is ok to take as needed.     You will receive a call one business day prior to surgery with an arrival time and hospital directions. If your surgery is scheduled on a Monday, the hospital will be calling you on the Friday prior to your surgery. If you have not heard from anyone by 8pm, please call the hospital supervisor through the hospital  at [phone]. ([place] [phone] or [place] [phone]).    Do not eat or drink anything after midnight the night before your surgery, including candy, mints, lifesavers, or chewing gum. Do not drink alcohol 24hrs before your surgery. Try not to smoke at least 24hrs before your surgery.       Follow the pre surgery showering instructions as listed in the “My Surgical Experience Booklet” or otherwise provided by your surgeon's office. Do not use a blade to shave the surgical area 1 week before surgery. It is okay to use a clean electric clippers up to 24 hours before surgery. Do not apply any lotions, creams, including makeup, cologne, deodorant, or perfumes after showering on the day of your surgery. Do not use dry shampoo, hair spray, hair gel, or any type of hair products.     No contact lenses, eye make-up, or artificial eyelashes. Remove nail polish, including gel polish, and any artificial, gel, or acrylic nails if possible. Remove all jewelry including rings and body piercing jewelry.     Wear causal clothing that is easy to take on and off. Consider your type of surgery.    Keep any valuables, jewelry, piercings at home. Please bring any specially  ordered equipment (sling, braces) if indicated.    Arrange for a responsible person to drive you to and from the hospital on the day of your surgery. Please confirm the visitor policy for the day of your procedure when you receive your phone call with an arrival time.     Call the surgeon's office with any new illnesses, exposures, or additional questions prior to surgery.    Please reference your “My Surgical Experience Booklet” for additional information to prepare for your upcoming surgery.

## 2024-09-18 ENCOUNTER — HOSPITAL ENCOUNTER (OUTPATIENT)
Facility: HOSPITAL | Age: 54
Setting detail: OUTPATIENT SURGERY
Discharge: HOME | End: 2024-09-18
Attending: PLASTIC SURGERY | Admitting: PLASTIC SURGERY
Payer: COMMERCIAL

## 2024-09-18 ENCOUNTER — ANESTHESIA (OUTPATIENT)
Dept: PERIOP | Facility: HOSPITAL | Age: 54
End: 2024-09-18
Payer: COMMERCIAL

## 2024-09-18 VITALS
RESPIRATION RATE: 18 BRPM | HEIGHT: 66 IN | SYSTOLIC BLOOD PRESSURE: 103 MMHG | OXYGEN SATURATION: 92 % | HEART RATE: 55 BPM | WEIGHT: 265 LBS | TEMPERATURE: 96.7 F | DIASTOLIC BLOOD PRESSURE: 65 MMHG | BODY MASS INDEX: 42.59 KG/M2

## 2024-09-18 DIAGNOSIS — T65.291A TOXIC EFFECT OF TOBACCO AND NICOTINE, ACCIDENTAL OR UNINTENTIONAL, INITIAL ENCOUNTER: ICD-10-CM

## 2024-09-18 DIAGNOSIS — R52 PAIN: Primary | ICD-10-CM

## 2024-09-18 RX ORDER — MAGNESIUM HYDROXIDE 1200 MG/15ML
LIQUID ORAL AS NEEDED
Status: DISCONTINUED | OUTPATIENT
Start: 2024-09-18 | End: 2024-09-18 | Stop reason: HOSPADM

## 2024-09-18 RX ORDER — ONDANSETRON 2 MG/ML
INJECTION INTRAMUSCULAR; INTRAVENOUS AS NEEDED
Status: DISCONTINUED | OUTPATIENT
Start: 2024-09-18 | End: 2024-09-18

## 2024-09-18 RX ORDER — MIDAZOLAM HYDROCHLORIDE 2 MG/2ML
INJECTION, SOLUTION INTRAMUSCULAR; INTRAVENOUS AS NEEDED
Status: DISCONTINUED | OUTPATIENT
Start: 2024-09-18 | End: 2024-09-18

## 2024-09-18 RX ORDER — DEXAMETHASONE SODIUM PHOSPHATE 10 MG/ML
INJECTION, SOLUTION INTRAMUSCULAR; INTRAVENOUS AS NEEDED
Status: DISCONTINUED | OUTPATIENT
Start: 2024-09-18 | End: 2024-09-18

## 2024-09-18 RX ORDER — FENTANYL CITRATE/PF 50 MCG/ML
25 SYRINGE (ML) INJECTION
Status: DISCONTINUED | OUTPATIENT
Start: 2024-09-18 | End: 2024-09-18 | Stop reason: HOSPADM

## 2024-09-18 RX ORDER — GLYCOPYRROLATE 0.2 MG/ML
INJECTION INTRAMUSCULAR; INTRAVENOUS AS NEEDED
Status: DISCONTINUED | OUTPATIENT
Start: 2024-09-18 | End: 2024-09-18

## 2024-09-18 RX ORDER — SODIUM CHLORIDE, SODIUM LACTATE, POTASSIUM CHLORIDE, CALCIUM CHLORIDE 600; 310; 30; 20 MG/100ML; MG/100ML; MG/100ML; MG/100ML
INJECTION, SOLUTION INTRAVENOUS CONTINUOUS PRN
Status: DISCONTINUED | OUTPATIENT
Start: 2024-09-18 | End: 2024-09-18

## 2024-09-18 RX ORDER — ALBUTEROL SULFATE 0.83 MG/ML
2.5 SOLUTION RESPIRATORY (INHALATION) ONCE AS NEEDED
Status: DISCONTINUED | OUTPATIENT
Start: 2024-09-18 | End: 2024-09-18 | Stop reason: HOSPADM

## 2024-09-18 RX ORDER — LIDOCAINE HYDROCHLORIDE 10 MG/ML
INJECTION, SOLUTION EPIDURAL; INFILTRATION; INTRACAUDAL; PERINEURAL AS NEEDED
Status: DISCONTINUED | OUTPATIENT
Start: 2024-09-18 | End: 2024-09-18

## 2024-09-18 RX ORDER — ONDANSETRON 4 MG/1
4 TABLET, ORALLY DISINTEGRATING ORAL EVERY 6 HOURS PRN
Status: DISCONTINUED | OUTPATIENT
Start: 2024-09-18 | End: 2024-09-18 | Stop reason: HOSPADM

## 2024-09-18 RX ORDER — HYDROMORPHONE HCL IN WATER/PF 6 MG/30 ML
0.2 PATIENT CONTROLLED ANALGESIA SYRINGE INTRAVENOUS
Status: DISCONTINUED | OUTPATIENT
Start: 2024-09-18 | End: 2024-09-18 | Stop reason: HOSPADM

## 2024-09-18 RX ORDER — ONDANSETRON 2 MG/ML
4 INJECTION INTRAMUSCULAR; INTRAVENOUS ONCE AS NEEDED
Status: DISCONTINUED | OUTPATIENT
Start: 2024-09-18 | End: 2024-09-18 | Stop reason: HOSPADM

## 2024-09-18 RX ORDER — BUPIVACAINE HYDROCHLORIDE 2.5 MG/ML
INJECTION, SOLUTION EPIDURAL; INFILTRATION; INTRACAUDAL AS NEEDED
Status: DISCONTINUED | OUTPATIENT
Start: 2024-09-18 | End: 2024-09-18 | Stop reason: HOSPADM

## 2024-09-18 RX ORDER — METOCLOPRAMIDE HYDROCHLORIDE 5 MG/ML
10 INJECTION INTRAMUSCULAR; INTRAVENOUS ONCE AS NEEDED
Status: DISCONTINUED | OUTPATIENT
Start: 2024-09-18 | End: 2024-09-18 | Stop reason: HOSPADM

## 2024-09-18 RX ORDER — FENTANYL CITRATE 50 UG/ML
INJECTION, SOLUTION INTRAMUSCULAR; INTRAVENOUS AS NEEDED
Status: DISCONTINUED | OUTPATIENT
Start: 2024-09-18 | End: 2024-09-18

## 2024-09-18 RX ORDER — PROPOFOL 10 MG/ML
INJECTION, EMULSION INTRAVENOUS AS NEEDED
Status: DISCONTINUED | OUTPATIENT
Start: 2024-09-18 | End: 2024-09-18

## 2024-09-18 RX ORDER — TRAMADOL HYDROCHLORIDE 50 MG/1
50 TABLET ORAL EVERY 6 HOURS PRN
Status: DISCONTINUED | OUTPATIENT
Start: 2024-09-18 | End: 2024-09-18 | Stop reason: HOSPADM

## 2024-09-18 RX ADMIN — MIDAZOLAM 2 MG: 1 INJECTION INTRAMUSCULAR; INTRAVENOUS at 09:20

## 2024-09-18 RX ADMIN — DEXMEDETOMIDINE HYDROCHLORIDE 4 MCG: 100 INJECTION, SOLUTION INTRAVENOUS at 09:25

## 2024-09-18 RX ADMIN — DEXMEDETOMIDINE HYDROCHLORIDE 4 MCG: 100 INJECTION, SOLUTION INTRAVENOUS at 09:35

## 2024-09-18 RX ADMIN — LIDOCAINE HYDROCHLORIDE 50 MG: 10 INJECTION, SOLUTION EPIDURAL; INFILTRATION; INTRACAUDAL; PERINEURAL at 09:28

## 2024-09-18 RX ADMIN — ONDANSETRON 4 MG: 2 INJECTION INTRAMUSCULAR; INTRAVENOUS at 09:42

## 2024-09-18 RX ADMIN — DEXAMETHASONE SODIUM PHOSPHATE 10 MG: 10 INJECTION, SOLUTION INTRAMUSCULAR; INTRAVENOUS at 09:42

## 2024-09-18 RX ADMIN — GLYCOPYRROLATE 0.1 MG: 0.2 INJECTION INTRAMUSCULAR; INTRAVENOUS at 09:20

## 2024-09-18 RX ADMIN — PROPOFOL 200 MG: 10 INJECTION, EMULSION INTRAVENOUS at 09:28

## 2024-09-18 RX ADMIN — FENTANYL CITRATE 50 MCG: 50 INJECTION, SOLUTION INTRAMUSCULAR; INTRAVENOUS at 09:38

## 2024-09-18 RX ADMIN — FENTANYL CITRATE 50 MCG: 50 INJECTION, SOLUTION INTRAMUSCULAR; INTRAVENOUS at 09:28

## 2024-09-18 RX ADMIN — SODIUM CHLORIDE, SODIUM LACTATE, POTASSIUM CHLORIDE, AND CALCIUM CHLORIDE: .6; .31; .03; .02 INJECTION, SOLUTION INTRAVENOUS at 09:03

## 2024-09-18 RX ADMIN — ONDANSETRON 4 MG: 2 INJECTION INTRAMUSCULAR; INTRAVENOUS at 10:05

## 2024-09-18 RX ADMIN — DEXMEDETOMIDINE HYDROCHLORIDE 8 MCG: 100 INJECTION, SOLUTION INTRAVENOUS at 10:18

## 2024-09-18 RX ADMIN — DEXMEDETOMIDINE HYDROCHLORIDE 4 MCG: 100 INJECTION, SOLUTION INTRAVENOUS at 09:24

## 2024-09-18 NOTE — NURSING NOTE
Pt is awake,alert,ambulatory. Written and verbal instructions given to pt, son at bedside, they verbalize an understanding. Diet tolerated.

## 2024-09-18 NOTE — ANESTHESIA POSTPROCEDURE EVALUATION
Post-Op Assessment Note    CV Status:  Stable  Pain Score: 0    Pain management: adequate       Mental Status:  Arousable   Hydration Status:  Euvolemic   PONV Controlled:  Controlled   Airway Patency:  Patent     Post Op Vitals Reviewed: Yes    No anethesia notable event occurred.    Staff: CRNA           BP   126/61   Temp 97.8   Pulse 76   Resp 22      94% ra

## 2024-09-18 NOTE — OP NOTE
OPERATIVE REPORT  PATIENT NAME: Mena Maier    :  1970  MRN: [id number]  Pt Location:  OR ROOM 08    SURGERY DATE: 2024    Surgeons and Role:     * Frankie Wilson MD - Primary    Preop and postoperative diagnosis:  Trigger thumb, right thumb [M65.311]  Trigger finger, right ring finger [M65.341]  Recurrent carpal tunnel syndrome, right upper limb [G56.01]    Procedure(s):  Right - RELEASE THUMB & RING TRIGGER DIGITS  Right - RELEASE RECURRENT CARPAL TUNNEL COMPRESSION    Operative history: In spite of steroid injection she has persistent extreme discomfort on flexing her right thumb and ring fingers.  She has trigger digits due to a stenosing tenosynovitis.  This was more serious in the right thumb but the A1 pulley was released for both these digits allowing full passive range of motion.  She also had nerve conduction studies showing a mild recurrent right carpal tunnel syndrome.  Decompression of the median nerve in the carpal tunnel was done showing that they were actually 2 large branches forming the median nerve however separate.  There was extensive scar tissue around these areas that was released so that now hopefully they are totally decompressed again.    Operative procedure: The patient was taken to the op room and placed supine the operating table.  She was prepped and draped in the usual fashion.  Anesthesia was General Via laryngal mask anesthesia.  The right arm was exsanguinated with an Esmarch and a tourniquet inflated 250 mm pressure.  A curvilinear incision was made proximal to the distal wrist crease to allow exploration revealing the course of the median nerve.  This then allowed an incision in the mid palmar scar from her prior procedure there is extended slightly toward the mid palmar space.  With care visualizing the median nerve all the overlying tissue was divided throughout this area showing that there was still continuity of the transverse carpal ligament across the  median nerve.  Hemostasis as necessary with the bipolar.  An ulnar epi neurotomy was done on each of the 2 large branches of the median nerve found in the carpal tunnel freed it up totally from surrounding scar tissue.    Next a proximal digital crease incision was made over the palmar aspect of the right thumb.  Subcutaneous tissues were  being careful not to injure the neurovascular structures on either side.  There was a markedly thickened area that is not necessarily part of the flexor tendon sheath but proximal to that had to be divided exposing the flexor tendon.  The flexor tendon sheath was the actually opened up to the midportion of the proximal phalanx.  This allowed full passive range of motion of the thumb.    A distal palmar crease incision was made overlying the right ring finger ray.  Again subcutaneous tissues were  to expose the flexor tendon sheath.  The A1 pulley was divided up to the proximal digital crease level.  This incision as well as that at the base of the right thumb were closed with 4-0 nylon erupted simple sutures.  The incision palmar incision overlying the carpal tunnel was closed also with 4 nylon erupted simple sutures.  A bulky hand dressing was then placed in the usual fashion.  The patient tolerated these multiple procedures well and was taken to the recovery area in good condition.           SIGNATURE: Frankie Wilson MD  DATE: September 18, 2024  TIME: 10:46 AM

## 2024-09-18 NOTE — NURSING NOTE
Pt returned to [facility] awake,alert,taking po, denies pain, fingers to right hand are pink and warm with (+) movement.

## 2024-09-18 NOTE — ANESTHESIA PREPROCEDURE EVALUATION
Procedure:  RELEASE THUMB & RING TRIGGER DIGITS (Right: Hand)  RELEASE RECURRENT CARPAL TUNNEL COMPRESSION (Right: Hand)    Relevant Problems   No relevant active problems        Physical Exam    Airway    Mallampati score: III  TM Distance: >3 FB  Neck ROM: full     Dental   No notable dental hx     Cardiovascular  Cardiovascular exam normal    Pulmonary  Pulmonary exam normal     Other Findings  post-pubertal.    Afib s/p ablation  Anesthesia Plan  ASA Score- 2     Anesthesia Type- IV sedation with anesthesia with ASA Monitors.         Additional Monitors:     Airway Plan:            Plan Factors-Exercise tolerance (METS): >4 METS.    Chart reviewed. EKG reviewed.  Existing labs reviewed. Patient summary reviewed.    Patient is a current smoker.  Patient smoked on day of surgery.            Induction- intravenous.    Postoperative Plan- Plan for postoperative opioid use.         Informed Consent- Anesthetic plan and risks discussed with patient.  I personally reviewed this patient with the CRNA. Discussed and agreed on the Anesthesia Plan with the CRNA..

## 2025-01-23 ENCOUNTER — PROCEDURE VISIT (OUTPATIENT)
Dept: GYNECOLOGY | Facility: CLINIC | Age: 55
End: 2025-01-23
Payer: COMMERCIAL

## 2025-01-23 VITALS
SYSTOLIC BLOOD PRESSURE: 103 MMHG | DIASTOLIC BLOOD PRESSURE: 65 MMHG | WEIGHT: 265 LBS | BODY MASS INDEX: 42.59 KG/M2 | HEIGHT: 66 IN | HEART RATE: 55 BPM

## 2025-01-23 DIAGNOSIS — N95.0 PMB (POSTMENOPAUSAL BLEEDING): Primary | ICD-10-CM

## 2025-01-23 PROCEDURE — 99213 OFFICE O/P EST LOW 20 MIN: CPT | Performed by: OBSTETRICS & GYNECOLOGY

## 2025-01-23 NOTE — PROGRESS NOTES
"Name: Mena Maier      : 1970      MRN: [id number]  Encounter Provider: Seth Barajas DO  Encounter Date: 2025   Encounter department: [facility]  :  Assessment & Plan  PMB (postmenopausal bleeding)  Patient will return to the office for TVS possible biopsy possible saline infusion           History of Present Illness   HPI  Mena Maier is a 54 y.o. female who presents patient presents the office complaining of vaginal bleeding.  She had 2 days of light vaginal bleeding approximately 3 to 4 weeks ago.  She had mild cramping with this.  She denied any fever or abdominal or pelvic pain.  She did have a workup for postmenopausal bleeding in 2024.  Biopsy and SIS was normal.      Review of Systems       Objective   /65   Pulse 55   Ht 5' 6\" (1.676 m)   Wt 120 kg (265 lb)   LMP 10/29/2021   BMI 42.77 kg/m²      Physical Exam  Vitals reviewed.   Abdominal:      General: There is no distension.      Palpations: Abdomen is soft. There is no mass.      Tenderness: There is no abdominal tenderness. There is no guarding or rebound.      Hernia: No hernia is present. There is no hernia in the left inguinal area or right inguinal area.   Genitourinary:     General: Normal vulva.      Labia:         Right: No rash, tenderness or lesion.         Left: No rash, tenderness or lesion.       Vagina: Normal.      Cervix: Normal.      Uterus: Normal.       Adnexa:         Right: No mass, tenderness or fullness.          Left: No mass, tenderness or fullness.     Lymphadenopathy:      Lower Body: No right inguinal adenopathy. No left inguinal adenopathy.   Neurological:      Mental Status: She is alert.           "

## 2025-02-19 ENCOUNTER — ULTRASOUND (OUTPATIENT)
Dept: GYNECOLOGY | Facility: CLINIC | Age: 55
End: 2025-02-19
Payer: COMMERCIAL

## 2025-02-19 ENCOUNTER — OFFICE VISIT (OUTPATIENT)
Dept: GYNECOLOGY | Facility: CLINIC | Age: 55
End: 2025-02-19
Payer: COMMERCIAL

## 2025-02-19 DIAGNOSIS — N95.0 PMB (POSTMENOPAUSAL BLEEDING): Primary | ICD-10-CM

## 2025-02-19 PROCEDURE — 58340 CATHETER FOR HYSTEROGRAPHY: CPT | Performed by: OBSTETRICS & GYNECOLOGY

## 2025-02-19 PROCEDURE — 88305 TISSUE EXAM BY PATHOLOGIST: CPT | Performed by: STUDENT IN AN ORGANIZED HEALTH CARE EDUCATION/TRAINING PROGRAM

## 2025-02-19 PROCEDURE — 58100 BIOPSY OF UTERUS LINING: CPT | Performed by: OBSTETRICS & GYNECOLOGY

## 2025-02-19 PROCEDURE — 76831 ECHO EXAM UTERUS: CPT | Performed by: OBSTETRICS & GYNECOLOGY

## 2025-02-19 PROCEDURE — 99214 OFFICE O/P EST MOD 30 MIN: CPT | Performed by: OBSTETRICS & GYNECOLOGY

## 2025-02-19 NOTE — PROGRESS NOTES
Name: Mena Maier      : 1970      MRN: [id number]  Encounter Provider: Seth Barajas DO  Encounter Date: 2025   Encounter department: [facility]  :  Assessment & Plan  PMB (postmenopausal bleeding)       Reviewed ultrasound findings with patient with biopsy results pending.  If postmenopausal bleeding persists despite a normal endometrial biopsy patient will return to the office      History of Present Illness   HPI  Mena Maier is a 54 y.o. female who presented to the office on  complaining of vaginal bleeding.  She stated she had 2 days of light vaginal bleeding approximately 3 to 4 weeks prior to that visit.  She had mild cramping associated with that.  She denied any fever, abdominal or pelvic pain.  She did have a workup for postmenopausal bleeding in 2024.  Biopsy and SIS were normal at that time.  She was advised to return to the office for repeat TVS possible biopsy possible saline      Review of Systems       Objective   LMP 10/29/2021      Physical Exam    Procedure Orders   AMB US Pelvic Non OB [[id number]] ordered by Jaja Jeffrey   Sonohysterogram [[id number]] ordered by Jaja Jeffrey   Endometrial biopsy [[id number]] ordered by Jaja Jeffrey     Post-procedure Diagnoses   PMB (postmenopausal bleeding) [N95.0]          Cosign Needed         AMB US Pelvic Non OB     Date/Time: 2025 1:00 PM     Performed by: Jaja Jeffrey  Authorized by: Seth Barajas DO  Universal Protocol:  Consent: Verbal consent obtained.  Consent given by: patient  Timeout called at: 2025 1:13 PM.  Patient understanding: patient states understanding of the procedure being performed  Patient identity confirmed: verbally with patient     Procedure details:     SIS Procedure: Yes    Indications: non-obstetric vaginal bleeding      Technique:  Transvaginal US, Non-OB    Position: lithotomy exam    Uterine findings:     Length (cm): 7.96     Height (cm):  4.64    Width (cm):  5.19    Endometrial stripe: identified      Endometrium thickness (mm):  10.38  Left ovary findings:     Left ovary:  Visualized    Length (cm): 2.13    Height (cm): 1.08    Width (cm): 1.57  Right ovary findings:     Right ovary:  Visualized    Length (cm): 2.65    Height (cm): 1.32    Width (cm): 1.49  Other findings:     Free pelvic fluid: not identified      Free peritoneal fluid: not identified    Post-Procedure Details:     Impression:  Anteverted uterus is inhomogeneous throughout with a posterior intramural fibroid, 2.3cm. The endometrium is thickened for postmenopausal patient. The bilateral ovaries appear within normal limits. No free fluid.     Tolerance:  Tolerated well, no immediate complications    Complications: no complications    Additional Procedure Comments:      GE Voluson P8 transvaginal transducer RIC5-RA with Serial Number [id number] was used during procedure and subsequently cleaned with high level disinfection utilizing the Responsa EPR Probe .      Ultrasound performed at:      [facility]  [address] [place] [address]  Phone: [phone]  Fax:  [phone]        Sonohysterogram     Date/Time: 2/19/2025 1:00 PM     Performed by: Seth Barajas DO  Authorized by: Seth Barajas DO  Universal Protocol:  Consent: Verbal consent obtained.  Consent given by: patient  Timeout called at: 2/19/2025 1:13 PM.  Patient understanding: patient states understanding of the procedure being performed  Patient identity confirmed: verbally with patient     Pre-procedure:     Prepped with: Betadine    Procedure:     Cervix cleaned and prepped: yes      Tenaculum applied to cervix: yes      Uterus sounded: yes      Catheter inserted: yes      Uterine cavity distended with saline: yes    Post-procedure:     Patient observed: yes      Post procedure instructions given to patient: yes      Patient  tolerated procedure well with no complications: yes    Comments:      Sonohysterogram demonstrates a smooth appearing endometrium.   Endometrial biopsy     Date/Time: 2/19/2025 1:00 PM     Performed by: Seth Barajas DO  Authorized by: Seth Barajas DO  Universal Protocol:  Consent: Verbal consent obtained.  Consent given by: patient  Timeout called at: 2/19/2025 1:13 PM.  Patient understanding: patient states understanding of the procedure being performed  Patient identity confirmed: verbally with patient     Indication:     Indications: Post-menopausal bleeding    Procedure:     Procedure: endometrial biopsy with Pipelle      A bivalve speculum was placed in the vagina: yes      Cervix cleaned and prepped: yes      Specimen collected: specimen collected and sent to pathology      Patient tolerated procedure well with no complications: yes

## 2025-02-19 NOTE — PROGRESS NOTES
AMB US Pelvic Non OB    Date/Time: 2/19/2025 1:00 PM    Performed by: Jaja Jeffrey  Authorized by: DO Carlos Alberto Rodas Protocol:  Consent: Verbal consent obtained.  Consent given by: patient  Timeout called at: 2/19/2025 1:13 PM.  Patient understanding: patient states understanding of the procedure being performed  Patient identity confirmed: verbally with patient    Procedure details:     SIS Procedure: Yes    Indications: non-obstetric vaginal bleeding      Technique:  Transvaginal US, Non-OB    Position: lithotomy exam    Uterine findings:     Length (cm): 7.96    Height (cm):  4.64    Width (cm):  5.19    Endometrial stripe: identified      Endometrium thickness (mm):  10.38  Left ovary findings:     Left ovary:  Visualized    Length (cm): 2.13    Height (cm): 1.08    Width (cm): 1.57  Right ovary findings:     Right ovary:  Visualized    Length (cm): 2.65    Height (cm): 1.32    Width (cm): 1.49  Other findings:     Free pelvic fluid: not identified      Free peritoneal fluid: not identified    Post-Procedure Details:     Impression:  Anteverted uterus is inhomogeneous throughout with a posterior intramural fibroid, 2.3cm. The endometrium is thickened for postmenopausal patient. The bilateral ovaries appear within normal limits. No free fluid.     Tolerance:  Tolerated well, no immediate complications    Complications: no complications    Additional Procedure Comments:      GE Voluson P8 transvaginal transducer RIC5-RA with Serial Number [id number] was used during procedure and subsequently cleaned with high level disinfection utilizing the moziy EPR Probe .     Ultrasound performed at:     [facility]  [address] [place] [address]  Phone: [phone]  Fax:  [phone]      Sonohysterogram    Date/Time: 2/19/2025 1:00 PM    Performed by: Seth Barajas DO  Authorized by: DO Carlos Alberto Rodas  Protocol:  Consent: Verbal consent obtained.  Consent given by: patient  Timeout called at: 2/19/2025 1:13 PM.  Patient understanding: patient states understanding of the procedure being performed  Patient identity confirmed: verbally with patient    Pre-procedure:     Prepped with: Betadine    Procedure:     Cervix cleaned and prepped: yes      Tenaculum applied to cervix: yes      Uterus sounded: yes      Catheter inserted: yes      Uterine cavity distended with saline: yes    Post-procedure:     Patient observed: yes      Post procedure instructions given to patient: yes      Patient tolerated procedure well with no complications: yes    Comments:      Sonohysterogram demonstrates a smooth appearing endometrium.   Endometrial biopsy    Date/Time: 2/19/2025 1:00 PM    Performed by: Seth Barajas DO  Authorized by: Seth Barajas DO  Universal Protocol:  Consent: Verbal consent obtained.  Consent given by: patient  Timeout called at: 2/19/2025 1:13 PM.  Patient understanding: patient states understanding of the procedure being performed  Patient identity confirmed: verbally with patient    Indication:     Indications: Post-menopausal bleeding    Procedure:     Procedure: endometrial biopsy with Pipelle      A bivalve speculum was placed in the vagina: yes      Cervix cleaned and prepped: yes      Specimen collected: specimen collected and sent to pathology      Patient tolerated procedure well with no complications: yes

## 2025-02-21 PROCEDURE — 88305 TISSUE EXAM BY PATHOLOGIST: CPT | Performed by: STUDENT IN AN ORGANIZED HEALTH CARE EDUCATION/TRAINING PROGRAM

## 2025-06-12 ENCOUNTER — OFFICE VISIT (OUTPATIENT)
Dept: GYNECOLOGY | Facility: CLINIC | Age: 55
End: 2025-06-12
Payer: COMMERCIAL

## 2025-06-12 VITALS
DIASTOLIC BLOOD PRESSURE: 78 MMHG | SYSTOLIC BLOOD PRESSURE: 108 MMHG | HEART RATE: 81 BPM | HEIGHT: 66 IN | BODY MASS INDEX: 36.96 KG/M2 | WEIGHT: 230 LBS

## 2025-06-12 DIAGNOSIS — N95.0 PMB (POSTMENOPAUSAL BLEEDING): Primary | ICD-10-CM

## 2025-06-12 PROCEDURE — 58100 BIOPSY OF UTERUS LINING: CPT | Performed by: OBSTETRICS & GYNECOLOGY

## 2025-06-12 PROCEDURE — 99213 OFFICE O/P EST LOW 20 MIN: CPT | Performed by: OBSTETRICS & GYNECOLOGY

## 2025-06-12 PROCEDURE — 88305 TISSUE EXAM BY PATHOLOGIST: CPT | Performed by: PATHOLOGY

## 2025-06-12 RX ORDER — TIRZEPATIDE 7.5 MG/.5ML
INJECTION, SOLUTION SUBCUTANEOUS
COMMUNITY
Start: 2025-06-09

## 2025-06-12 RX ORDER — DILTIAZEM HYDROCHLORIDE 120 MG/1
120 CAPSULE, COATED, EXTENDED RELEASE ORAL DAILY
COMMUNITY
Start: 2025-03-03

## 2025-06-12 RX ORDER — TIRZEPATIDE 10 MG/.5ML
10 INJECTION, SOLUTION SUBCUTANEOUS
COMMUNITY
Start: 2025-06-10

## 2025-06-12 NOTE — PROGRESS NOTES
":  Assessment & Plan  PMB (postmenopausal bleeding)             History of Present Illness     Mena Maier is a 55 y.o. female   HPI patient presents the office complaining of postmenopausal bleeding.  She had an episode of very light pink discharge last week.  She had a workup for postmenopausal bleeding in February of this year.  She was noted to have a small intramural fibroid.  Biopsy was benign.  Saline infusion was negative.  She had no bleeding since that workup up until this past week.  She has no pain.  Denies any vaginal discharge.  No urinary or GI complaints.  Review of Systems  Objective   /78   Pulse 81   Ht 5' 6\" (1.676 m)   Wt 104 kg (230 lb)   LMP 10/29/2021   BMI 37.12 kg/m²      Physical Exam  Vitals reviewed.   Abdominal:      General: There is no distension.      Palpations: Abdomen is soft. There is no mass.      Tenderness: There is no abdominal tenderness. There is no guarding or rebound.      Hernia: No hernia is present. There is no hernia in the left inguinal area or right inguinal area.   Genitourinary:     General: Normal vulva.      Labia:         Right: No rash, tenderness or lesion.         Left: No rash, tenderness or lesion.       Vagina: Normal.      Cervix: Normal.      Uterus: Normal.       Adnexa:         Right: No mass, tenderness or fullness.          Left: No mass, tenderness or fullness.     Lymphadenopathy:      Lower Body: No right inguinal adenopathy. No left inguinal adenopathy.     Neurological:      Mental Status: She is alert.     Endometrial biopsy    Date/Time: 6/12/2025 11:30 AM    Performed by: Seth Barajas DO  Authorized by: Seth Barajas DO    Universal Protocol:  Consent: Verbal consent obtained  Risks and benefits: risks, benefits and alternatives were discussed  Consent given by: patient  Patient understanding: patient states understanding of the procedure being performed  Patient identity confirmed: verbally with " patient    Indication:     Indications: Post-menopausal bleeding    Procedure:     Procedure: endometrial biopsy with Pipelle      A bivalve speculum was placed in the vagina: yes      Cervix cleaned and prepped: yes      The cervix was dilated: no      Uterus sounded: yes      Uterus sound depth (cm):  7    Specimen collected: specimen collected and sent to pathology    Findings:     Uterus size:  Non-gravid    Cervix: normal      Adnexa: normal

## 2025-06-18 PROCEDURE — 88305 TISSUE EXAM BY PATHOLOGIST: CPT | Performed by: PATHOLOGY

## 2025-07-15 ENCOUNTER — TELEPHONE (OUTPATIENT)
Age: 55
End: 2025-07-15

## 2025-07-15 ENCOUNTER — PREP FOR PROCEDURE (OUTPATIENT)
Age: 55
End: 2025-07-15

## 2025-07-15 DIAGNOSIS — Z12.11 SCREENING FOR COLON CANCER: Primary | ICD-10-CM

## (undated) DEVICE — NEEDLE BLUNT 18 G X 1 1/2IN

## (undated) DEVICE — ACE WRAP 4 IN UNSTERILE

## (undated) DEVICE — INTENDED FOR TISSUE SEPARATION, AND OTHER PROCEDURES THAT REQUIRE A SHARP SURGICAL BLADE TO PUNCTURE OR CUT.: Brand: BARD-PARKER ® SAFETYLOCK CARBON RIB-BACK BLADES

## (undated) DEVICE — STOCKINETTE 2P PREROLLD 6X60

## (undated) DEVICE — CAST PADDING 4 IN SYNTHETIC NON-STRL

## (undated) DEVICE — KERLIX BANDAGE ROLL: Brand: KERLIX

## (undated) DEVICE — NEEDLE 25G X 1 1/2

## (undated) DEVICE — ABDOMINAL PAD: Brand: DERMACEA

## (undated) DEVICE — DRAPE SHEET THREE QUARTER

## (undated) DEVICE — OCCLUSIVE GAUZE STRIP,3% BISMUTH TRIBROMOPHENATE IN PETROLATUM BLEND: Brand: XEROFORM

## (undated) DEVICE — CABLE BIPOLAR DISP MEGADYNE

## (undated) DEVICE — SOLUTION BOWL: Brand: KENDALL

## (undated) DEVICE — SYRINGE 30ML LL

## (undated) DEVICE — CUFF TOURNIQUET 18 X 4 IN QUICK CONNECT DISP 1 BLADDER

## (undated) DEVICE — GAUZE SPONGES,16 PLY: Brand: CURITY

## (undated) DEVICE — SYRINGE 10ML LL CONTROL TOP

## (undated) DEVICE — BRUSH EZ SCRUB PCMX W/NAIL CLEANER

## (undated) DEVICE — BETHLEHEM UNIVERSAL  MIONR EXT: Brand: CARDINAL HEALTH

## (undated) DEVICE — STERILE POLYISOPRENE POWDER-FREE SURGICAL GLOVES: Brand: PROTEXIS